# Patient Record
Sex: MALE | Race: OTHER | HISPANIC OR LATINO | Employment: OTHER | URBAN - METROPOLITAN AREA
[De-identification: names, ages, dates, MRNs, and addresses within clinical notes are randomized per-mention and may not be internally consistent; named-entity substitution may affect disease eponyms.]

---

## 2023-06-09 ENCOUNTER — APPOINTMENT (OUTPATIENT)
Dept: NON INVASIVE DIAGNOSTICS | Facility: HOSPITAL | Age: 68
End: 2023-06-09
Payer: MEDICARE

## 2023-06-09 ENCOUNTER — APPOINTMENT (EMERGENCY)
Dept: CT IMAGING | Facility: HOSPITAL | Age: 68
End: 2023-06-09
Payer: MEDICARE

## 2023-06-09 ENCOUNTER — HOSPITAL ENCOUNTER (OUTPATIENT)
Facility: HOSPITAL | Age: 68
Setting detail: OBSERVATION
Discharge: HOME | End: 2023-06-11
Attending: EMERGENCY MEDICINE | Admitting: INTERNAL MEDICINE
Payer: MEDICARE

## 2023-06-09 DIAGNOSIS — N17.9 AKI (ACUTE KIDNEY INJURY) (HCC): ICD-10-CM

## 2023-06-09 DIAGNOSIS — E11.65 HYPERGLYCEMIA DUE TO DIABETES MELLITUS (HCC): ICD-10-CM

## 2023-06-09 DIAGNOSIS — Z95.828 S/P IVC FILTER: ICD-10-CM

## 2023-06-09 DIAGNOSIS — R65.10 SIRS (SYSTEMIC INFLAMMATORY RESPONSE SYNDROME) (HCC): ICD-10-CM

## 2023-06-09 DIAGNOSIS — K52.9 COLITIS: Primary | ICD-10-CM

## 2023-06-09 PROBLEM — I82.409 DVT (DEEP VENOUS THROMBOSIS) (HCC): Status: ACTIVE | Noted: 2023-06-09

## 2023-06-09 PROBLEM — F39 MOOD DISORDER (HCC): Status: ACTIVE | Noted: 2023-06-09

## 2023-06-09 PROBLEM — R93.89 ABNORMAL CT SCAN: Status: ACTIVE | Noted: 2023-06-09

## 2023-06-09 PROBLEM — E11.3299 CONTROLLED TYPE 2 DIABETES MELLITUS WITH MILD NONPROLIFERATIVE RETINOPATHY WITHOUT MACULAR EDEMA, WITH LONG-TERM CURRENT USE OF INSULIN (HCC): Status: ACTIVE | Noted: 2023-06-09

## 2023-06-09 PROBLEM — Z79.4 CONTROLLED TYPE 2 DIABETES MELLITUS WITH MILD NONPROLIFERATIVE RETINOPATHY WITHOUT MACULAR EDEMA, WITH LONG-TERM CURRENT USE OF INSULIN (HCC): Status: ACTIVE | Noted: 2023-06-09

## 2023-06-09 PROBLEM — I10 HYPERTENSION: Status: ACTIVE | Noted: 2023-06-09

## 2023-06-09 LAB
ABO GROUP BLD: NORMAL
ABO GROUP BLD: NORMAL
ALBUMIN SERPL BCP-MCNC: 3.9 G/DL (ref 3.5–5)
ALP SERPL-CCNC: 81 U/L (ref 34–104)
ALT SERPL W P-5'-P-CCNC: 29 U/L (ref 7–52)
ANION GAP SERPL CALCULATED.3IONS-SCNC: 13 MMOL/L (ref 4–13)
APTT PPP: 36 SECONDS (ref 23–37)
APTT PPP: >210 SECONDS (ref 23–37)
AST SERPL W P-5'-P-CCNC: 41 U/L (ref 13–39)
ATRIAL RATE: 103 BPM
BASOPHILS # BLD MANUAL: 0 THOUSAND/UL (ref 0–0.1)
BASOPHILS NFR MAR MANUAL: 0 % (ref 0–1)
BILIRUB SERPL-MCNC: 0.84 MG/DL (ref 0.2–1)
BLD GP AB SCN SERPL QL: NEGATIVE
BUN SERPL-MCNC: 24 MG/DL (ref 5–25)
CALCIUM SERPL-MCNC: 10.8 MG/DL (ref 8.4–10.2)
CHLORIDE SERPL-SCNC: 98 MMOL/L (ref 96–108)
CO2 SERPL-SCNC: 24 MMOL/L (ref 21–32)
CREAT SERPL-MCNC: 1.62 MG/DL (ref 0.6–1.3)
EOSINOPHIL # BLD MANUAL: 0 THOUSAND/UL (ref 0–0.4)
EOSINOPHIL NFR BLD MANUAL: 0 % (ref 0–6)
ERYTHROCYTE [DISTWIDTH] IN BLOOD BY AUTOMATED COUNT: 13.2 % (ref 11.6–15.1)
GFR SERPL CREATININE-BSD FRML MDRD: 43 ML/MIN/1.73SQ M
GLUCOSE SERPL-MCNC: 216 MG/DL (ref 65–140)
GLUCOSE SERPL-MCNC: 311 MG/DL (ref 65–140)
GLUCOSE SERPL-MCNC: 407 MG/DL (ref 65–140)
GLUCOSE SERPL-MCNC: 488 MG/DL (ref 65–140)
GLUCOSE SERPL-MCNC: 90 MG/DL (ref 65–140)
HCT VFR BLD AUTO: 45.8 % (ref 36.5–49.3)
HGB BLD-MCNC: 14.8 G/DL (ref 12–17)
INR PPP: 1.87 (ref 0.84–1.19)
LG PLATELETS BLD QL SMEAR: PRESENT
LIPASE SERPL-CCNC: 11 U/L (ref 11–82)
LYMPHOCYTES # BLD AUTO: 0.71 THOUSAND/UL (ref 0.6–4.47)
LYMPHOCYTES # BLD AUTO: 4 % (ref 14–44)
MCH RBC QN AUTO: 31.3 PG (ref 26.8–34.3)
MCHC RBC AUTO-ENTMCNC: 32.3 G/DL (ref 31.4–37.4)
MCV RBC AUTO: 97 FL (ref 82–98)
MONOCYTES # BLD AUTO: 1.6 THOUSAND/UL (ref 0–1.22)
MONOCYTES NFR BLD: 9 % (ref 4–12)
NEUTROPHILS # BLD MANUAL: 15.49 THOUSAND/UL (ref 1.85–7.62)
NEUTS BAND NFR BLD MANUAL: 3 % (ref 0–8)
NEUTS SEG NFR BLD AUTO: 84 % (ref 43–75)
P AXIS: 62 DEGREES
PLATELET # BLD AUTO: 229 THOUSANDS/UL (ref 149–390)
PLATELET BLD QL SMEAR: ADEQUATE
PMV BLD AUTO: 10.6 FL (ref 8.9–12.7)
POTASSIUM SERPL-SCNC: 5 MMOL/L (ref 3.5–5.3)
PR INTERVAL: 160 MS
PROT SERPL-MCNC: 7.5 G/DL (ref 6.4–8.4)
PROTHROMBIN TIME: 21.4 SECONDS (ref 11.6–14.5)
QRS AXIS: 35 DEGREES
QRSD INTERVAL: 70 MS
QT INTERVAL: 310 MS
QTC INTERVAL: 406 MS
RBC # BLD AUTO: 4.73 MILLION/UL (ref 3.88–5.62)
RBC MORPH BLD: NORMAL
RH BLD: POSITIVE
RH BLD: POSITIVE
SODIUM SERPL-SCNC: 135 MMOL/L (ref 135–147)
SPECIMEN EXPIRATION DATE: NORMAL
T WAVE AXIS: 71 DEGREES
VENTRICULAR RATE: 103 BPM
WBC # BLD AUTO: 17.81 THOUSAND/UL (ref 4.31–10.16)

## 2023-06-09 PROCEDURE — 80053 COMPREHEN METABOLIC PANEL: CPT | Performed by: EMERGENCY MEDICINE

## 2023-06-09 PROCEDURE — 93010 ELECTROCARDIOGRAM REPORT: CPT | Performed by: STUDENT IN AN ORGANIZED HEALTH CARE EDUCATION/TRAINING PROGRAM

## 2023-06-09 PROCEDURE — 82948 REAGENT STRIP/BLOOD GLUCOSE: CPT

## 2023-06-09 PROCEDURE — 96366 THER/PROPH/DIAG IV INF ADDON: CPT

## 2023-06-09 PROCEDURE — 93970 EXTREMITY STUDY: CPT | Performed by: SURGERY

## 2023-06-09 PROCEDURE — 85007 BL SMEAR W/DIFF WBC COUNT: CPT | Performed by: EMERGENCY MEDICINE

## 2023-06-09 PROCEDURE — 74177 CT ABD & PELVIS W/CONTRAST: CPT

## 2023-06-09 PROCEDURE — 36415 COLL VENOUS BLD VENIPUNCTURE: CPT | Performed by: EMERGENCY MEDICINE

## 2023-06-09 PROCEDURE — 99223 1ST HOSP IP/OBS HIGH 75: CPT | Performed by: SURGERY

## 2023-06-09 PROCEDURE — 87505 NFCT AGENT DETECTION GI: CPT | Performed by: INTERNAL MEDICINE

## 2023-06-09 PROCEDURE — 93005 ELECTROCARDIOGRAM TRACING: CPT

## 2023-06-09 PROCEDURE — C9113 INJ PANTOPRAZOLE SODIUM, VIA: HCPCS | Performed by: INTERNAL MEDICINE

## 2023-06-09 PROCEDURE — G1004 CDSM NDSC: HCPCS

## 2023-06-09 PROCEDURE — 83690 ASSAY OF LIPASE: CPT | Performed by: EMERGENCY MEDICINE

## 2023-06-09 PROCEDURE — 99223 1ST HOSP IP/OBS HIGH 75: CPT | Performed by: INTERNAL MEDICINE

## 2023-06-09 PROCEDURE — 85730 THROMBOPLASTIN TIME PARTIAL: CPT | Performed by: EMERGENCY MEDICINE

## 2023-06-09 PROCEDURE — 85610 PROTHROMBIN TIME: CPT | Performed by: EMERGENCY MEDICINE

## 2023-06-09 PROCEDURE — 96375 TX/PRO/DX INJ NEW DRUG ADDON: CPT

## 2023-06-09 PROCEDURE — 85027 COMPLETE CBC AUTOMATED: CPT | Performed by: EMERGENCY MEDICINE

## 2023-06-09 PROCEDURE — 86901 BLOOD TYPING SEROLOGIC RH(D): CPT | Performed by: EMERGENCY MEDICINE

## 2023-06-09 PROCEDURE — 86850 RBC ANTIBODY SCREEN: CPT | Performed by: EMERGENCY MEDICINE

## 2023-06-09 PROCEDURE — 85730 THROMBOPLASTIN TIME PARTIAL: CPT | Performed by: INTERNAL MEDICINE

## 2023-06-09 PROCEDURE — 86900 BLOOD TYPING SEROLOGIC ABO: CPT | Performed by: EMERGENCY MEDICINE

## 2023-06-09 PROCEDURE — 93970 EXTREMITY STUDY: CPT

## 2023-06-09 PROCEDURE — 99284 EMERGENCY DEPT VISIT MOD MDM: CPT

## 2023-06-09 PROCEDURE — 87493 C DIFF AMPLIFIED PROBE: CPT | Performed by: INTERNAL MEDICINE

## 2023-06-09 PROCEDURE — 96365 THER/PROPH/DIAG IV INF INIT: CPT

## 2023-06-09 RX ORDER — CEFTRIAXONE SODIUM 1 G/50ML
1000 INJECTION, SOLUTION INTRAVENOUS EVERY 24 HOURS
Status: DISCONTINUED | OUTPATIENT
Start: 2023-06-10 | End: 2023-06-09 | Stop reason: SDUPTHER

## 2023-06-09 RX ORDER — DIVALPROEX SODIUM 250 MG/1
500 TABLET, EXTENDED RELEASE ORAL
Status: DISCONTINUED | OUTPATIENT
Start: 2023-06-09 | End: 2023-06-11 | Stop reason: HOSPADM

## 2023-06-09 RX ORDER — TAMSULOSIN HYDROCHLORIDE 0.4 MG/1
0.4 CAPSULE ORAL
COMMUNITY

## 2023-06-09 RX ORDER — HALOPERIDOL 5 MG/1
5 TABLET ORAL
COMMUNITY

## 2023-06-09 RX ORDER — INSULIN LISPRO 100 [IU]/ML
2-12 INJECTION, SOLUTION INTRAVENOUS; SUBCUTANEOUS
Status: DISCONTINUED | OUTPATIENT
Start: 2023-06-09 | End: 2023-06-11 | Stop reason: HOSPADM

## 2023-06-09 RX ORDER — ONDANSETRON 2 MG/ML
4 INJECTION INTRAMUSCULAR; INTRAVENOUS EVERY 6 HOURS PRN
Status: DISCONTINUED | OUTPATIENT
Start: 2023-06-09 | End: 2023-06-11 | Stop reason: HOSPADM

## 2023-06-09 RX ORDER — HEPARIN SODIUM 1000 [USP'U]/ML
2800 INJECTION, SOLUTION INTRAVENOUS; SUBCUTANEOUS EVERY 6 HOURS PRN
Status: DISCONTINUED | OUTPATIENT
Start: 2023-06-09 | End: 2023-06-10

## 2023-06-09 RX ORDER — TAMSULOSIN HYDROCHLORIDE 0.4 MG/1
0.4 CAPSULE ORAL
Status: DISCONTINUED | OUTPATIENT
Start: 2023-06-09 | End: 2023-06-11 | Stop reason: HOSPADM

## 2023-06-09 RX ORDER — LORAZEPAM 2 MG/1
2 TABLET ORAL 2 TIMES DAILY
COMMUNITY

## 2023-06-09 RX ORDER — DOXEPIN HYDROCHLORIDE 50 MG/1
100 CAPSULE ORAL
Status: DISCONTINUED | OUTPATIENT
Start: 2023-06-09 | End: 2023-06-11 | Stop reason: HOSPADM

## 2023-06-09 RX ORDER — ACETAMINOPHEN 325 MG/1
650 TABLET ORAL EVERY 6 HOURS PRN
Status: DISCONTINUED | OUTPATIENT
Start: 2023-06-09 | End: 2023-06-11 | Stop reason: HOSPADM

## 2023-06-09 RX ORDER — PANTOPRAZOLE SODIUM 40 MG/10ML
40 INJECTION, POWDER, LYOPHILIZED, FOR SOLUTION INTRAVENOUS EVERY 12 HOURS SCHEDULED
Status: DISCONTINUED | OUTPATIENT
Start: 2023-06-09 | End: 2023-06-10

## 2023-06-09 RX ORDER — HEPARIN SODIUM 1000 [USP'U]/ML
5600 INJECTION, SOLUTION INTRAVENOUS; SUBCUTANEOUS EVERY 6 HOURS PRN
Status: DISCONTINUED | OUTPATIENT
Start: 2023-06-09 | End: 2023-06-10

## 2023-06-09 RX ORDER — INSULIN LISPRO 100 [IU]/ML
25 INJECTION, SUSPENSION SUBCUTANEOUS 2 TIMES DAILY WITH MEALS
COMMUNITY
End: 2023-06-11

## 2023-06-09 RX ORDER — FENTANYL CITRATE 50 UG/ML
50 INJECTION, SOLUTION INTRAMUSCULAR; INTRAVENOUS ONCE
Status: COMPLETED | OUTPATIENT
Start: 2023-06-09 | End: 2023-06-09

## 2023-06-09 RX ORDER — MAGNESIUM HYDROXIDE/ALUMINUM HYDROXICE/SIMETHICONE 120; 1200; 1200 MG/30ML; MG/30ML; MG/30ML
30 SUSPENSION ORAL EVERY 6 HOURS PRN
Status: DISCONTINUED | OUTPATIENT
Start: 2023-06-09 | End: 2023-06-11 | Stop reason: HOSPADM

## 2023-06-09 RX ORDER — GLIPIZIDE 10 MG/1
10 TABLET ORAL
COMMUNITY

## 2023-06-09 RX ORDER — DIVALPROEX SODIUM 500 MG/1
500 TABLET, EXTENDED RELEASE ORAL
COMMUNITY

## 2023-06-09 RX ORDER — SODIUM CHLORIDE, SODIUM GLUCONATE, SODIUM ACETATE, POTASSIUM CHLORIDE, MAGNESIUM CHLORIDE, SODIUM PHOSPHATE, DIBASIC, AND POTASSIUM PHOSPHATE .53; .5; .37; .037; .03; .012; .00082 G/100ML; G/100ML; G/100ML; G/100ML; G/100ML; G/100ML; G/100ML
75 INJECTION, SOLUTION INTRAVENOUS CONTINUOUS
Status: DISPENSED | OUTPATIENT
Start: 2023-06-09 | End: 2023-06-11

## 2023-06-09 RX ORDER — DOXEPIN HYDROCHLORIDE 100 MG/1
100 CAPSULE ORAL
COMMUNITY

## 2023-06-09 RX ORDER — HALOPERIDOL 5 MG/1
7.5 TABLET ORAL
Status: DISCONTINUED | OUTPATIENT
Start: 2023-06-09 | End: 2023-06-11 | Stop reason: HOSPADM

## 2023-06-09 RX ORDER — HEPARIN SODIUM 10000 [USP'U]/100ML
3-30 INJECTION, SOLUTION INTRAVENOUS
Status: DISCONTINUED | OUTPATIENT
Start: 2023-06-09 | End: 2023-06-10

## 2023-06-09 RX ORDER — INSULIN ASPART 100 [IU]/ML
20 INJECTION, SUSPENSION SUBCUTANEOUS
Status: DISCONTINUED | OUTPATIENT
Start: 2023-06-09 | End: 2023-06-10

## 2023-06-09 RX ORDER — LORAZEPAM 1 MG/1
2 TABLET ORAL 2 TIMES DAILY PRN
Status: DISCONTINUED | OUTPATIENT
Start: 2023-06-09 | End: 2023-06-11 | Stop reason: HOSPADM

## 2023-06-09 RX ORDER — CEFEPIME HYDROCHLORIDE 2 G/50ML
2000 INJECTION, SOLUTION INTRAVENOUS ONCE
Status: COMPLETED | OUTPATIENT
Start: 2023-06-09 | End: 2023-06-09

## 2023-06-09 RX ORDER — CEFTRIAXONE SODIUM 1 G/50ML
1000 INJECTION, SOLUTION INTRAVENOUS EVERY 24 HOURS
Status: DISCONTINUED | OUTPATIENT
Start: 2023-06-09 | End: 2023-06-11 | Stop reason: HOSPADM

## 2023-06-09 RX ADMIN — INSULIN ASPART 20 UNITS: 100 INJECTION, SUSPENSION SUBCUTANEOUS at 10:40

## 2023-06-09 RX ADMIN — CEFEPIME HYDROCHLORIDE 2000 MG: 2 INJECTION, SOLUTION INTRAVENOUS at 09:06

## 2023-06-09 RX ADMIN — INSULIN LISPRO 4 UNITS: 100 INJECTION, SOLUTION INTRAVENOUS; SUBCUTANEOUS at 16:33

## 2023-06-09 RX ADMIN — SODIUM CHLORIDE, SODIUM LACTATE, POTASSIUM CHLORIDE, AND CALCIUM CHLORIDE 1000 ML: .6; .31; .03; .02 INJECTION, SOLUTION INTRAVENOUS at 06:11

## 2023-06-09 RX ADMIN — INSULIN LISPRO 8 UNITS: 100 INJECTION, SOLUTION INTRAVENOUS; SUBCUTANEOUS at 13:10

## 2023-06-09 RX ADMIN — HEPARIN SODIUM 18 UNITS/KG/HR: 10000 INJECTION, SOLUTION INTRAVENOUS at 12:01

## 2023-06-09 RX ADMIN — PANTOPRAZOLE SODIUM 40 MG: 40 INJECTION, POWDER, FOR SOLUTION INTRAVENOUS at 10:39

## 2023-06-09 RX ADMIN — IOHEXOL 100 ML: 350 INJECTION, SOLUTION INTRAVENOUS at 06:59

## 2023-06-09 RX ADMIN — INSULIN ASPART 20 UNITS: 100 INJECTION, SUSPENSION SUBCUTANEOUS at 16:33

## 2023-06-09 RX ADMIN — DOXEPIN HYDROCHLORIDE 100 MG: 50 CAPSULE ORAL at 22:04

## 2023-06-09 RX ADMIN — SODIUM CHLORIDE, SODIUM GLUCONATE, SODIUM ACETATE, POTASSIUM CHLORIDE, MAGNESIUM CHLORIDE, SODIUM PHOSPHATE, DIBASIC, AND POTASSIUM PHOSPHATE 75 ML/HR: .53; .5; .37; .037; .03; .012; .00082 INJECTION, SOLUTION INTRAVENOUS at 12:00

## 2023-06-09 RX ADMIN — HALOPERIDOL 7.5 MG: 5 TABLET ORAL at 22:04

## 2023-06-09 RX ADMIN — PANTOPRAZOLE SODIUM 40 MG: 40 INJECTION, POWDER, FOR SOLUTION INTRAVENOUS at 22:04

## 2023-06-09 RX ADMIN — CEFTRIAXONE 1000 MG: 1 INJECTION, SOLUTION INTRAVENOUS at 22:20

## 2023-06-09 RX ADMIN — TAMSULOSIN HYDROCHLORIDE 0.4 MG: 0.4 CAPSULE ORAL at 16:32

## 2023-06-09 RX ADMIN — FENTANYL CITRATE 50 MCG: 50 INJECTION, SOLUTION INTRAMUSCULAR; INTRAVENOUS at 06:07

## 2023-06-09 RX ADMIN — DIVALPROEX SODIUM 500 MG: 250 TABLET, EXTENDED RELEASE ORAL at 22:04

## 2023-06-09 NOTE — ASSESSMENT & PLAN NOTE
"No results found for: \"HGBA1C\"    No results for input(s): \"POCGLU\" in the last 72 hours      Blood Sugar Average: Last 72 hrs:    Patient has history of insulin-dependent type 2 diabetes on 70 3025 units twice daily  Patient denies nausea and reports he can eat today  Hyperglycemic on admission  We will start 70 30 20 units twice daily plus sliding scale  "

## 2023-06-09 NOTE — ASSESSMENT & PLAN NOTE
History of DVT on Xarelto  No previous records, patient with creatinine of 1 6  We will maintain on heparin drip for now until renal function improves Private Vehicle

## 2023-06-09 NOTE — CONSULTS
Consult Note - Vascular Surgery   Devin Verde 79 y o  male MRN: [id number]    Unit/Bed#: E2 -01 Encounter: [id number]    Assessment:  Presence of IVC with abnormalities on CT scan  IVC occlusion  -IVC placed 10 years ago in [place]  -No records or prior vascular imaging available  Vascular findings on CT a/p 6/9/23: Presence of IVC filter    -Most of the filter legs protrude through the wall of the IVC including 1 that projects into the aorta which is likely chronic    -There is also a fractured leg extending above the tip of the filter that also chronically protrudes through the ventral left aspect of the IVC  -The superior aspect of the fractured leg is just posterior to the third portion of the duodenum  No retroperitoneal hematoma or stranding    -No active contrast extravasation  IVC is collapsed and nonopacified at and below the level of the filter likely due to chronic occlusion  Iliac veins are small    -There are several collateral vessels in the retroperitoneum and ventral abdominal wall      Recommendations  -Chronic protrusions/fractured leg of IVC filter noted on CT scans  -No current symptoms attributable to IVC   -No indication for vascular surgery or procedures on the IVC at this time  -Continue with chronic anticoagulation with rivaroxaban 20 mg daily  -Would benefit from compression stockings  -Follow-up as routine with his vascular providers when he returns home  -Call if any questions  -Patient seen and examined with Dr Dania Halsted  -Findings were communicated to the Sonya Peterson attending  -Will follow as needed      Bilateral chronic DVT's   -Chronically on a/c x 10 years  -Chronic leg swelling without increase leg pain or edema  -LE venous duplex 6/9/23: B, chronic, non-occlusive DVT in the B CFA  Chronic R gastroc  DVT    Recommendations:  -As long as he tolerates, he should likely continue (lifelong) chronic anticoagulation on rivaroxaban 20 mg  -Consider compression stockings  -Compression, elevation and activity as tolerated  -He can follow up with his regular doctors in [place] for further recommendations      Requesting Service: SLIM    Reason for consultation: Presence of IV filter with filter leg protrusion    HPI: Asaf James is a 79 y o  male visiting from [facility] with diabetes requiring insulin, bilateral DVTs on chronic anticoagulation with Xarelto 20 mg and IVC filter implanted about 10 years ago per patient  Patient reports that he came to [facility] from [address] 3 days ago to visit his daughter  He was in his usual state of health until last night when after eating pork ribs he developed abdominal pain and profuse diarrhea for which he came into the hospital  He was seen in the ED and underwent a CT scan of the abdomen/pelvis which is significant for nonspecific colitis possible infectious or inflammatory etiology  Also colonoscopy was recommended  The CT scanner also identified patient's IVC filter and noted that the suture line protruding through the wall of the IVC which is likely chronic finding  There was no evidence of retroperitoneal hematoma or expanding  There is likely an IVC occlusion at the level of the filter  There are noted to be several collateral vessels in the retroperitoneum and intra-abdominal wall  There are noted to be collateral vessels in the retroperitoneum and ventral abdominal wall  Vascular surgery was asked to consult regarding IVC filter findings  Patient reports abdominal pain and aching  He has no chest pain or shortness of breath  He does not feel feverish  He has not had any sick contacts  Review of vital signs shows stable blood pressure  He has been afebrile  Review of Systems:  Review of systems as above otherwise essentially negative  General: No fevers     Cardiovascular: no chest pain or dyspnea on exertion  Respiratory: no cough, shortness of breath, or wheezing  Gastrointestinal: as above  Genitourinary ROS: negative  Musculoskeletal ROS: negative  Neurological ROS: no TIA or stroke symptoms  Hematological and Lymphatic ROS: negative  Dermatological ROS: negative  Psychological ROS: negative  Ophthalmic ROS: negative  ENT ROS: negative    Past Medical History:  No past medical history on file  Past Surgical History:  No past surgical history on file  Social History:  Social History     Substance and Sexual Activity   Alcohol Use Not on file     Social History     Substance and Sexual Activity   Drug Use Not on file     Social History     Tobacco Use   Smoking Status Not on file   Smokeless Tobacco Not on file       Family History:  No family history on file  Allergies:   Allergies   Allergen Reactions   • Aspirin Anaphylaxis       Medications:  Current Facility-Administered Medications   Medication Dose Route Frequency   • acetaminophen (TYLENOL) tablet 650 mg  650 mg Oral Q6H PRN   • aluminum-magnesium hydroxide-simethicone (MYLANTA) oral suspension 30 mL  30 mL Oral Q6H PRN   • cefTRIAXone (ROCEPHIN) IVPB (premix) 1,000 mg 50 mL  1,000 mg Intravenous Q24H   • divalproex sodium (DEPAKOTE ER) 24 hr tablet 500 mg  500 mg Oral HS   • doxepin (SINEquan) capsule 100 mg  100 mg Oral HS   • haloperidol (HALDOL) tablet 7 5 mg  7 5 mg Oral HS   • heparin (porcine) 25,000 units in 0 45% NaCl 250 mL infusion (premix)  3-30 Units/kg/hr (Order-Specific) Intravenous Titrated   • heparin (porcine) injection 2,800 Units  2,800 Units Intravenous Q6H PRN   • heparin (porcine) injection 5,600 Units  5,600 Units Intravenous Q6H PRN   • insulin aspart protamine-insulin aspart (NovoLOG 70/30) 100 units/mL subcutaneous injection 20 Units  20 Units Subcutaneous BID AC   • insulin lispro (HumaLOG) 100 units/mL subcutaneous injection 2-12 Units  2-12 Units Subcutaneous 4x Daily (AC & HS)   • LORazepam (ATIVAN) tablet 2 mg  2 mg Oral BID PRN   • multi-electrolyte (PLASMALYTE-A/ISOLYTE-S PH 7 4) IV solution  75 "mL/hr Intravenous Continuous   • ondansetron (ZOFRAN) injection 4 mg  4 mg Intravenous Q6H PRN   • pantoprazole (PROTONIX) injection 40 mg  40 mg Intravenous Q12H [facility]   • tamsulosin (FLOMAX) capsule 0 4 mg  0 4 mg Oral Daily With Dinner       Vitals:  /69 (BP Location: Right arm)   Pulse 104   Temp 98 5 °F (36 9 °C) (Temporal)   Resp 18   Ht 5' 9\" (1 753 m)   Wt 71 7 kg (158 lb)   SpO2 98%   BMI 23 33 kg/m²     I/Os:  I/O last 24 hours: In: 1050 [IV Piggyback:1050]  Out: -     Lab Results and Cultures:   Lab Results   Component Value Date    HCT 45 8 06/09/2023    HGB 14 8 06/09/2023    MCV 97 06/09/2023     06/09/2023    WBC 17 81 (H) 06/09/2023     Lab Results   Component Value Date    BUN 24 06/09/2023    CALCIUM 10 8 (H) 06/09/2023    CL 98 06/09/2023    CO2 24 06/09/2023    CREATININE 1 62 (H) 06/09/2023    K 5 0 06/09/2023     Lab Results   Component Value Date    INR 1 87 (H) 06/09/2023    PROTIME 21 4 (H) 06/09/2023       Lipid Panel: No results found for: \"CHOL\",     Blood Culture: No results found for: \"BLOODCX\",   Urinalysis: No results found for: \"BILIRUBINUR\", \"BLOODU\", \"CLARITYU\", \"COLORU\", \"GLUCOSEU\", \"KETONESU\", \"LEUKOCYTESUR\", \"NITRITE\", \"PHUR\", \"PROTEINUA\", \"SPECGRAV\",   Urine Culture: No results found for: \"URINECX\",   Wound Culure: No results found for: \"WOUNDCULT\"    Imaging:    CT abdomen pelvis with contrast 6/9/2023\  FINDINGS:     ABDOMEN     LOWER CHEST:  No clinically significant abnormality identified in the visualized lower chest      LIVER/BILIARY TREE: No focal liver lesion or biliary dilatation  Mild focal fatty infiltration  in segment 4 adjacent to the falciform ligament      GALLBLADDER: There are gallstone(s) within the gallbladder, without pericholecystic inflammatory changes      SPLEEN:  Unremarkable      PANCREAS:  Unremarkable      ADRENAL GLANDS:  Unremarkable      KIDNEYS/URETERS: Punctate bilateral nonobstructing renal calculi   Mild fullness of the " renal collecting systems  There is left renal uroepithelial enhancement      STOMACH AND BOWEL: There is diffuse symmetric thickening of the colon consistent with nonspecific colitis  Mild mesenteric edema  No pneumatosis or portal venous gas  Infectious/inflammatory etiology favored      There is more pronounced irregular asymmetric thickening along the posterior and left wall of the rectum  Underlying neoplasm cannot be excluded and recommend follow-up with colonoscopy      There is diverticulosis  No bowel obstruction      APPENDIX: A normal appendix was visualized      ABDOMINOPELVIC CAVITY: Trace ascites in the right lower quadrant     No pneumoperitoneum  No lymphadenopathy      VESSELS:  Atherosclerotic calcifications  No abdominal aortic aneurysm      There is an IVC filter  Most of the filter legs protrude through the wall of the IVC including 1 that projects into the aorta which is likely chronic  There is also a fractured leg extending above the tip of the filter that also chronically protrudes   through the ventral left aspect of the IVC  The superior aspect of the fractured leg is just posterior to the third portion of the duodenum  No retroperitoneal hematoma or stranding  No active contrast extravasation  IVC is collapsed and nonopacified at   and below the level of the filter likely due to chronic occlusion  Iliac veins are small  There are several collateral vessels in the retroperitoneum and ventral abdominal wall      PELVIS     REPRODUCTIVE ORGANS:  Unremarkable for patient's age      URINARY BLADDER: Bladder wall is thickened  Correlate with urinalysis      ABDOMINAL WALL/INGUINAL REGIONS:  Unremarkable      OSSEOUS STRUCTURES: No acute osseous abnormality  Left total hip replacement      IMPRESSION:  Nonspecific colitis  Favor infectious/inflammatory etiology  More pronounced asymmetric thickening of the rectum  Recommend follow-up colonoscopy as underlying neoplasm cannot be excluded    Trace free fluid  Suspect chronic occlusion of the IVC at and below the level of the IVC filter  See above regarding IVC filter legs            VAS LE venous duplex 6/9/2023 (prelim)    CLINICAL:  Indications: The patient is admitted with other medical concern  Physician ordering venous doppler, patient has pmh of DVT in the legs, and  there is an IVC filter in place, a recent CT noted broken wires/limbs on the  filter  The patient takes xarelto  Operative History:  IVC filter  Risk Factors  The patient has history of HTN, Diabetes (IDDM) and DVT  FINDINGS:     Right          Impression                             GSV Inguinal   E1  Non Occlusive Thrombus (Chronic)    CFV            E1 Non Occlusive Thrombus (Chronic)    Gastrocnemius  E1  Non Occlusive Thrombus (Chronic)       Left           Impression                             GSV Inguinal   E1  Non Occlusive Thrombus (Chronic)    Ext_Iliac      Normal (Patent)                        CFV            E1 Non Occlusive Thrombus (Chronic)             CONCLUSION:  RIGHT LOWER LIMB:  There is chronic, non-occlusive deep vein thrombosis in the common femoral  vein, with suggestion of focal segment of subacute upon chronic deep vein  thrombosis in the common femoral vein at the confluence of the saphenofemoral  junction  There is chronic deep vein thrombosis in a gastrocnemius vein behind the knee  The visualized portions of the posterior tibial and peroneal veins are patent  No evidence of superficial thrombophlebitis noted beyond the saphenofemoral  junction findings  Doppler evaluation shows a normal response to augmentation maneuvers     Popliteal, posterior tibial arterial Doppler waveform's are triphasic/biphasic  LEFT LOWER LIMB:  There is chronic deep vein thrombosis in the common femoral vein, with  suggestion of focal segment of subacute upon chronic deep vein thrombosis at the  confluence of the saphenofemoral junction    The external iliac vein is patent  The popliteal vein and visualized portions of the posterior tibial and peroneal  veins are patent  The greater saphenous vein beyond the saphenofemoral junction is patent  Doppler evaluation shows a normal response to augmentation maneuvers  Popliteal, posterior tibial arterial Doppler waveform's are triphasic/biphasic  This result has not been signed  Information might be incomplete  Physical Exam:     General appearance: Patient is lying flat in bed  He is ill-appearing, but in no apparent resp distress   Skin: Skin color, texture, turgor normal  No rashes or lesions  Neurologic: Grossly normal  Head: Normocephalic, without obvious abnormality, atraumatic  Eyes: PERRLE  EOMI  Neck: no JVD and supple, symmetrical, trachea midline  Back: symmetric, no curvature  ROM normal  No CVA tenderness  Lungs: overall  clear to auscultation bilaterally  Chest wall:  no tenderness  Heart:  regular rate and rhythm, S1, S2 normal, no murmur, click, rub or gallop  Abdomen: mild tenderness mid abdomen  Soft    Extremities: 1+ Bilateral LE edema with chronic venous stasis changes    Pulse exam:  DP: Right: 2+ Left: 2+      Farhan Chase PA-C  6/9/2023  [facility]

## 2023-06-09 NOTE — ASSESSMENT & PLAN NOTE
There is an IVC filter  Most of the filter legs protrude through the wall of the IVC including 1 that projects into the aorta which is likely chronic  There is also a fractured leg extending above the tip of the filter that also chronically protrudes   through the ventral left aspect of the IVC  The superior aspect of the fractured leg is just posterior to the third portion of the duodenum  No retroperitoneal hematoma or stranding  No active contrast extravasation  IVC is collapsed and nonopacified at   and below the level of the filter likely due to chronic occlusion  Iliac veins are small  There are several collateral vessels in the retroperitoneum and ventral abdominal wall      Would appreciate vascular surgery comment on findings above  We will maintain patient on heparin infusion

## 2023-06-09 NOTE — ED CARE HANDOFF
Emergency Department Sign Out Note        Sign out and transfer of care from Dr Esvin Dixon  See Separate Emergency Department note  The patient, Zaheer Joyner, was evaluated by the previous provider for diarrhea and abdominal pain  Workup Completed:  CBC with leukocytosis  Pt given IVF and pain medication  ED Course / Workup Pending (followup):  Pending remainder of labs and CTAP                                  ED Course as of 06/09/23 0933   Fri Jun 09, 2023   0700 WBC(!): 17 81   0713 Glucose, Random(!): 009  Uncomplicated hyperglycemia, labs do not suggest acidosis  Per family, pt with known diabetes  Receiving IVF now  1698 Creatinine(!): 1 62  Unclear baseline   0715 Remainder of labs WNL   0751 CT abdomen pelvis with contrast  1 ) Nonspecific colitis  Favor infectious/inflammatory etiology  2 ) More pronounced asymmetric thickening of the rectum  Recommend follow-up colonoscopy as underlying neoplasm cannot be excluded  3 ) Trace free fluid  4 ) Suspect chronic occlusion of the IVC at and below the level of the IVC filter  5 ) IVC Filter Legs - Most of the filter legs protrude through the wall of the IVC including 1 that projects into the aorta which is likely chronic  There is also a fractured leg extending above the tip of the filter that also chronically protrudes through the ventral left aspect of the IVC  The superior aspect of the fractured leg is just posterior to the third portion of the duodenum  No retroperitoneal hematoma or stranding  No active contrast extravasation  IVC is collapsed and nonopacified at and below the level of the filter likely due to chronic occlusion  Iliac veins are small  There are several collateral vessels in the retroperitoneum and ventral abdominal wall    0823 Pt and family updated on results  Pt reports to feeling better at this time, no further episodes of diarrhea  HR remains high 90-low 100's    Family is unaware of pt's IVC filter and "does not recall when it was placed or complications regarding it  Family reports pt had a DVT \"many years ago\"  Recommending admission given that pt meets SIRS criteria secondary to his colitis, along with hyperglycemia, ? CARA and complications of IVC filter  Pt and family in agreement  They will attempt to obtain records from pt's PCP in [place]  Colitis likely viral, however will cover with Abx  Will also check C  Diff    [id number] CT abdomen pelvis with contrast  ADDENDUM - There is bowel wall thickening and uroepithelial enhancement in the left renal collecting system  Recommend correlation with urinalysis  Will check UA   0850 Spoke with SLIM, discussed pt's history, presentation and work up  Will accept in admission  Procedures  Medical Decision Making  Amount and/or Complexity of Data Reviewed  Labs: ordered  Decision-making details documented in ED Course  Radiology: ordered  Decision-making details documented in ED Course  Risk  Prescription drug management  Decision regarding hospitalization                Disposition  Final diagnoses:   Colitis   SIRS (systemic inflammatory response syndrome) (HCC)   Hyperglycemia due to diabetes mellitus (HCC)   CARA (acute kidney injury) ([place] [address] ) - unknown baseline creatinine   S/P IVC filter - with likely chronic occlusion and complications of filter legs     Time reflects when diagnosis was documented in both MDM as applicable and the Disposition within this note     Time User Action Codes Description Comment    6/9/2023  8:51 AM [place], Myah Add [K52 9] Colitis     6/9/2023  8:51 AM Brandon, Myah Add [R65 10] SIRS (systemic inflammatory response syndrome) ([place] [address] )     6/9/2023  8:51 AM Brandon, Myah Add [E11 65] Hyperglycemia due to diabetes mellitus ([place] [address] )     6/9/2023  8:52 AM Brandon, Myah Add [N17 9] CARA (acute kidney injury) ([place] [address] )     6/9/2023  8:52 AM Brandon, Myah Modify [N17 9] CARA (acute kidney injury) ([place] [address] ) unknown baseline creatinine    " 6/9/2023  8:53 AM Mellisa Rosenbaum Add [Y63 215] S/P IVC filter     6/9/2023  8:54 AM Mellisa Rosenbaum Modify [I95 582] S/P IVC filter with likely chronic occlusion and complications of filter legs      ED Disposition     ED Disposition   Admit    Condition   Stable    Date/Time   Fri Jun 9, 2023  8:51 AM    Comment   Case was discussed with JESS and the patient's admission status was agreed to be Admission Status: observation status to the service of Dr Louise Aguero  Follow-up Information    None       Patient's Medications   Discharge Prescriptions    No medications on file     No discharge procedures on file         ED Provider  Electronically Signed by     Karol Carpenter DO  06/09/23 1484

## 2023-06-09 NOTE — ED NOTES
Patient transported to [address] [place] [address],[address], [address] [facility]  06/09/23 4663

## 2023-06-09 NOTE — ED PROVIDER NOTES
"History  Chief Complaint   Patient presents with   • Diarrhea     Pt ate around 4pm, and has the diarrhea since  Per family \"the stool looked black and smelt funny\"     Patient is a 71-year-old male from [facility] that presents with an abrupt onset of abdominal pain and diarrhea  Patient ate beans, pork and rice earlier which she has had before  Nobody else at home is sick  Patient's family states that he had multiple bouts of dark and foul-smelling stool  No known prior GI bleeding or colitis  They state that tonight he started to become dizzy and lightheaded and almost fell  History provided by:  Patient and relative   used: No    Diarrhea  Associated symptoms: abdominal pain    Associated symptoms: no chills, no fever and no vomiting        None       No past medical history on file  No past surgical history on file  No family history on file  I have reviewed and agree with the history as documented  No existing history information found  No existing history information found  Review of Systems   Constitutional: Negative for chills and fever  Eyes: Negative for visual disturbance  Respiratory: Negative for cough, chest tightness and shortness of breath  Cardiovascular: Negative for chest pain and leg swelling  Gastrointestinal: Positive for abdominal pain and diarrhea  Negative for blood in stool, nausea and vomiting  Skin: Positive for color change and pallor  Negative for rash and wound  Allergic/Immunologic: Negative for immunocompromised state  Neurological: Positive for dizziness and light-headedness  Negative for syncope  All other systems reviewed and are negative  Physical Exam  Physical Exam  Vitals and nursing note reviewed  Constitutional:       General: He is not in acute distress  Appearance: He is well-developed  He is not ill-appearing, toxic-appearing or diaphoretic  HENT:      Head: Normocephalic and atraumatic        " Right Ear: External ear normal       Left Ear: External ear normal       Nose: No congestion  Eyes:      General: No scleral icterus  Conjunctiva/sclera: Conjunctivae normal       Right eye: Right conjunctiva is not injected  Left eye: Left conjunctiva is not injected  Neck:      Trachea: No tracheal deviation  Cardiovascular:      Rate and Rhythm: Regular rhythm  Tachycardia present  Pulses: Normal pulses  Pulmonary:      Effort: Pulmonary effort is normal  No respiratory distress  Breath sounds: No stridor  Abdominal:      Palpations: Abdomen is soft  Tenderness: There is generalized abdominal tenderness  There is no guarding or rebound  Skin:     General: Skin is warm and dry  Capillary Refill: Capillary refill takes less than 2 seconds  Coloration: Skin is not pale  Findings: No erythema or rash  Neurological:      Mental Status: He is alert and oriented to person, place, and time  Motor: No abnormal muscle tone     Psychiatric:         Mood and Affect: Mood normal          Behavior: Behavior normal          Vital Signs  ED Triage Vitals [06/09/23 0509]   Temperature Pulse Respirations Blood Pressure SpO2   98 °F (36 7 °C) (!) 114 17 110/63 98 %      Temp Source Heart Rate Source Patient Position - Orthostatic VS BP Location FiO2 (%)   Oral Monitor Sitting Right arm --      Pain Score       --           Vitals:    06/09/23 0509   BP: 110/63   Pulse: (!) 114   Patient Position - Orthostatic VS: Sitting         Visual Acuity      ED Medications  Medications   lactated ringers bolus 1,000 mL (1,000 mL Intravenous New Bag 6/9/23 0611)   fentanyl citrate (PF) 100 MCG/2ML 50 mcg (50 mcg Intravenous Given 6/9/23 0607)       Diagnostic Studies  Results Reviewed     Procedure Component Value Units Date/Time    CBC and differential [[id number]]  (Abnormal) Collected: 06/09/23 0606    Lab Status: Preliminary result Specimen: Blood from Arm, Left Updated: 06/09/23 8519 WBC 17 81 Thousand/uL      RBC 4 73 Million/uL      Hemoglobin 14 8 g/dL      Hematocrit 45 8 %      MCV 97 fL      MCH 31 3 pg      MCHC 32 3 g/dL      RDW 13 2 %      MPV 10 6 fL      Platelets 899 Thousands/uL     Comprehensive metabolic panel [[id number]] Collected: 06/09/23 0606    Lab Status: In process Specimen: Blood from Arm, Left Updated: 06/09/23 0617    Lipase [[id number]] Collected: 06/09/23 0606    Lab Status: In process Specimen: Blood from Arm, Left Updated: 06/09/23 0617    Edgar Locket [[id number]] Collected: 06/09/23 0606    Lab Status: In process Specimen: Blood from Arm, Left Updated: 06/09/23 0617    APTT [[id number]] Collected: 06/09/23 0606    Lab Status: In process Specimen: Blood from Arm, Left Updated: 06/09/23 0617    Occult Blood 1-3 Stool, (Diagnostic) [[id number]]     Lab Status: No result Specimen: Stool from Rectum                  CT abdomen pelvis with contrast    (Results Pending)              Procedures  ECG 12 Lead Documentation Only    Date/Time: 6/9/2023 6:07 AM    Performed by: Rosi Self DO  Authorized by: Rosi Self DO    Indications / Diagnosis:  Tachycardia  ECG reviewed by me, the ED Provider: yes    Patient location:  ED  Previous ECG:     Previous ECG:  Unavailable  Interpretation:     Interpretation: non-specific    Rate:     ECG rate:  103    ECG rate assessment: normal    Rhythm:     Rhythm: sinus rhythm and sinus tachycardia    Ectopy:     Ectopy: none    QRS:     QRS intervals:  Normal  Conduction:     Conduction: normal    ST segments:     ST segments:  Non-specific  T waves:     T waves: non-specific               ED Course                                             Medical Decision Making  Given patient's symptoms I am concerned for possible GI bleed with dark and foul-smelling stools  He is tachycardic here  Abdomen is soft but does have diffuse tenderness  He has no prior records or labs to compare to    Plan is to check labs to evaluate for possible anemia, electrolyte disturbance, renal failure, coagulopathy, pancreatitis and dehydration  We will check an EKG for his tachycardia  We will obtain a CT of his abdomen pelvis for further evaluation of his symptoms  We will give fentanyl for pain and 1 L of IV fluids for possible dehydration  We will obtain a type and screen and transfuse if needed  EKG is nonspecific but no acute ischemic changes  Labs and CT pending  S/o to oncoming physician  Amount and/or Complexity of Data Reviewed  Labs: ordered  Radiology: ordered  Risk  Prescription drug management  Disposition  Final diagnoses:   None     ED Disposition     None      Follow-up Information    None         Patient's Medications    No medications on file       No discharge procedures on file      PDMP Review     None          ED Provider  Electronically Signed by           Damaris Morales DO  06/09/23 7100

## 2023-06-09 NOTE — ED NOTES
Pt back from [address] [place] [address],[address], [address] [facility]  06/09/23 8485

## 2023-06-09 NOTE — PLAN OF CARE
Problem: SAFETY ADULT  Goal: Patient will remain free of falls  Description: INTERVENTIONS:  - Educate patient/family on patient safety including physical limitations  - Instruct patient to call for assistance with activity   - Consult OT/PT to assist with strengthening/mobility   - Keep Call bell within reach  - Keep bed low and locked with side rails adjusted as appropriate  - Keep care items and personal belongings within reach  - Initiate and maintain comfort rounds  - Make Fall Risk Sign visible to staff  - Offer Toileting every 2 Hours, in advance of need  - Initiate/Maintain bed alarm  - Obtain necessary fall risk management equipment: walker  - Apply yellow socks and bracelet for high fall risk patients  - Consider moving patient to room near nurses station  Outcome: Progressing     Problem: GASTROINTESTINAL - ADULT  Goal: Minimal or absence of nausea and/or vomiting  Description: INTERVENTIONS:  - Administer IV fluids if ordered to ensure adequate hydration  - Maintain NPO status until nausea and vomiting are resolved  - Nasogastric tube if ordered  - Administer ordered antiemetic medications as needed  - Provide nonpharmacologic comfort measures as appropriate  - Advance diet as tolerated, if ordered  - Consider nutrition services referral to assist patient with adequate nutrition and appropriate food choices  Outcome: Progressing     Problem: GASTROINTESTINAL - ADULT  Goal: Maintains adequate nutritional intake  Description: INTERVENTIONS:  - Monitor percentage of each meal consumed  - Identify factors contributing to decreased intake, treat as appropriate  - Assist with meals as needed  - Monitor I&O, weight, and lab values if indicated  - Obtain nutrition services referral as needed  Outcome: Progressing     Problem: GENITOURINARY - ADULT  Goal: Maintains or returns to baseline urinary function  Description: INTERVENTIONS:  - Assess urinary function  - Encourage oral fluids to ensure adequate hydration if ordered  - Administer IV fluids as ordered to ensure adequate hydration  - Administer ordered medications as needed  - Offer frequent toileting  - Follow urinary retention protocol if ordered  Outcome: Progressing     Problem: METABOLIC, FLUID AND ELECTROLYTES - ADULT  Goal: Electrolytes maintained within normal limits  Description: INTERVENTIONS:  - Monitor labs and assess patient for signs and symptoms of electrolyte imbalances  - Administer electrolyte replacement as ordered  - Monitor response to electrolyte replacements, including repeat lab results as appropriate  - Instruct patient on fluid and nutrition as appropriate  Outcome: Progressing  Goal: Fluid balance maintained  Description: INTERVENTIONS:  - Monitor labs   - Monitor I/O and WT  - Instruct patient on fluid and nutrition as appropriate  - Assess for signs & symptoms of volume excess or deficit  Outcome: Progressing  Goal: Glucose maintained within target range  Description: INTERVENTIONS:  - Monitor Blood Glucose as ordered  - Assess for signs and symptoms of hyperglycemia and hypoglycemia  - Administer ordered medications to maintain glucose within target range  - Assess nutritional intake and initiate nutrition service referral as needed  Outcome: Progressing

## 2023-06-09 NOTE — H&P
[address] [facility]  H&P  Name: Jake Anguiano 79 y o  male I MRN: [id number]  Unit/Bed#: E2 -01 I Date of Admission: 6/9/2023   Date of Service: 6/9/2023 I Hospital Day: 0      Assessment/Plan   * Colitis  Assessment & Plan  Patient presents with profuse diarrhea and abdominal pain over the last 24 hours  He reportedly had a pork meal yesterday and then developed the symptoms  No one else was sick in the family  Denies nausea and vomiting  Does report chills  CT with nonspecific colitis  Likely viral  Check stool studies  Patient will be on Rocephin with possible UTI awaiting UA  Continue supportive care IV fluids, Protonix, Zofran      Abnormal CT scan  Assessment & Plan  Urothelial enhancement on CT scan  UA pending  We will continue Rocephin for now    Presence of IVC filter  Assessment & Plan  There is an IVC filter  Most of the filter legs protrude through the wall of the IVC including 1 that projects into the aorta which is likely chronic  There is also a fractured leg extending above the tip of the filter that also chronically protrudes   through the ventral left aspect of the IVC  The superior aspect of the fractured leg is just posterior to the third portion of the duodenum  No retroperitoneal hematoma or stranding  No active contrast extravasation  IVC is collapsed and nonopacified at   and below the level of the filter likely due to chronic occlusion  Iliac veins are small  There are several collateral vessels in the retroperitoneum and ventral abdominal wall      Would appreciate vascular surgery comment on findings above  We will maintain patient on heparin infusion    DVT (deep venous thrombosis) ([facility] [address] )  Assessment & Plan  History of DVT on Xarelto  No previous records, patient with creatinine of 1 6  We will maintain on heparin drip for now until renal function improves    Controlled type 2 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, with long-term current use of insulin ([facility])  Assessment & Plan  No results found for: \"HGBA1C\"    No results for input(s): \"POCGLU\" in the last 72 hours  Blood Sugar Average: Last 72 hrs:    Patient has history of insulin-dependent type 2 diabetes on 70 3025 units twice daily  Patient denies nausea and reports he can eat today  Hyperglycemic on admission  We will start 70 30 20 units twice daily plus sliding scale         VTE Prophylaxis: Heparin drip  Code Status: Level 1 full code    Anticipated Length of Stay:  Patient will be admitted on an Observation basis with an anticipated length of stay of less than 2 midnights  Justification for Hospital Stay: Need for IV medications    Total Time for Visit, including Counseling / Coordination of Care: 60 minutes  Greater than 50% of this total time spent on direct patient counseling and coordination of care  Chief Complaint:   Diarrhea, abdominal pain    History of Present Illness:    Leny Treviño is a 79 y o  male With past medical history of DVT on Xarelto with IVC filter, insulin-dependent type 2 diabetes, mood disorder who presents to the hospital with abdominal pain with 1 day history of acute diarrhea  Patient had pork and rice meal and developed symptoms with multiple bouts of foul-smelling liquid stool  He also had presyncopal symptoms with ambulation  Denies alcohol use drug use or tobacco use  Review of Systems:    Review of Systems   Constitutional: Positive for chills  Negative for fever  HENT: Negative for sore throat and trouble swallowing  Eyes: Negative for photophobia and visual disturbance  Respiratory: Negative for shortness of breath and wheezing  Cardiovascular: Negative for chest pain and palpitations  Gastrointestinal: Positive for abdominal pain and diarrhea  Negative for constipation, nausea and vomiting  Genitourinary: Negative for difficulty urinating and dysuria     Musculoskeletal: Negative for " arthralgias and myalgias  Skin: Negative for rash and wound  Neurological: Negative for dizziness, light-headedness and headaches  Past Medical and Surgical History:     No past medical history on file  No past surgical history on file  Meds/Allergies:    Prior to Admission medications    Medication Sig Start Date End Date Taking? Authorizing Provider   divalproex sodium (DEPAKOTE ER) 500 mg 24 hr tablet Take 500 mg by mouth daily at bedtime   Yes Historical Provider, MD   doxepin (SINEquan) 100 mg capsule Take 100 mg by mouth daily at bedtime   Yes Historical Provider, MD   glipiZIDE (GLUCOTROL) 10 mg tablet Take 10 mg by mouth 2 (two) times a day before meals   Yes Historical Provider, MD   haloperidol (HALDOL) 5 mg tablet Take 5 mg by mouth daily at bedtime Take 1 1/2 tabs at night   Yes Historical Provider, MD   insulin lispro protamine-insulin lispro (HumaLOG Mix 75/25) 100 units/mL Inject 25 Units under the skin 2 (two) times a day with meals   Yes Historical Provider, MD   LORazepam (ATIVAN) 2 mg tablet Take 2 mg by mouth 2 (two) times a day   Yes Historical Provider, MD   rivaroxaban (Xarelto) 20 mg tablet Take 20 mg by mouth daily   Yes Historical Provider, MD   tamsulosin (FLOMAX) 0 4 mg Take 0 4 mg by mouth daily with dinner   Yes Historical Provider, MD       Allergies:    Allergies   Allergen Reactions   • Aspirin Anaphylaxis       Social History:     Marital Status: /Civil Union   Substance Use History:   Social History     Substance and Sexual Activity   Alcohol Use Not on file     Social History     Tobacco Use   Smoking Status Not on file   Smokeless Tobacco Not on file     Social History     Substance and Sexual Activity   Drug Use Not on file       Family History:    Pertinent family history reviewed    Physical Exam:     Vitals:   Blood Pressure: 106/67 (06/09/23 0945)  Pulse: 104 (06/09/23 0945)  Temperature: 98 °F (36 7 °C) (06/09/23 0509)  Temp Source: Oral (06/09/23 "0509)  Respirations: 18 (06/09/23 0945)  Height: 5' 9\" (175 3 cm) (06/09/23 1905)  Weight - Scale: 71 7 kg (158 lb) (06/09/23 0958)  SpO2: 96 % (06/09/23 0945)    Physical Exam  Vitals reviewed  Constitutional:       General: He is not in acute distress  Appearance: He is well-developed  He is not ill-appearing, toxic-appearing or diaphoretic  HENT:      Head: Normocephalic and atraumatic  Mouth/Throat:      Mouth: Mucous membranes are moist    Eyes:      General: No scleral icterus  Extraocular Movements: Extraocular movements intact  Cardiovascular:      Rate and Rhythm: Normal rate and regular rhythm  Heart sounds: Normal heart sounds  Pulmonary:      Effort: Pulmonary effort is normal  No respiratory distress  Breath sounds: Normal breath sounds  No wheezing or rales  Abdominal:      General: There is no distension  Palpations: Abdomen is soft  Tenderness: There is no abdominal tenderness  There is no guarding or rebound  Musculoskeletal:         General: No swelling, tenderness or deformity  Right lower leg: Edema present  Left lower leg: Edema present  Comments: Ankle edema right greater than left   Skin:     General: Skin is warm and dry  Neurological:      General: No focal deficit present  Mental Status: He is alert  Mental status is at baseline  Psychiatric:         Mood and Affect: Mood normal          Behavior: Behavior normal          Thought Content:  Thought content normal          Judgment: Judgment normal           Additional Data:     Lab Results: I have reviewed pertinent results     Results from last 7 days   Lab Units 06/09/23  0606   BANDS PCT % 3   EOS PCT % 0   HEMATOCRIT % 45 8   HEMOGLOBIN g/dL 14 8   LYMPHO PCT % 4*   MONO PCT % 9   PLATELETS Thousands/uL 229   WBC Thousand/uL 17 81*     Results from last 7 days   Lab Units 06/09/23  0606   ANION GAP mmol/L 13   ALBUMIN g/dL 3 9   ALK PHOS U/L 81   ALT U/L 29   AST U/L 41* " BUN mg/dL 24   CALCIUM mg/dL 10 8*   CHLORIDE mmol/L 98   CO2 mmol/L 24   CREATININE mg/dL 1 62*   GLUCOSE RANDOM mg/dL 488*   POTASSIUM mmol/L 5 0   SODIUM mmol/L 135   TOTAL BILIRUBIN mg/dL 0 84     Results from last 7 days   Lab Units 06/09/23  0606   INR  1 87*                   Imaging: I have reviewed pertinent imaging     CT abdomen pelvis with contrast   Final Result by MYESHA Cantu MD (06/09 2949)   Addendum (preliminary) 1 of 1 by MYESHA Cantu MD (06/09 6885)   ADDENDUM:      As noted in the body the report, there is bowel wall thickening and    uroepithelial enhancement in the left renal collecting system  Recommend    correlation with urinalysis  Final   Nonspecific colitis  Favor infectious/inflammatory etiology  More pronounced asymmetric thickening of the rectum  Recommend follow-up    colonoscopy as underlying neoplasm cannot be excluded  Trace free fluid  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

## 2023-06-09 NOTE — ASSESSMENT & PLAN NOTE
Patient presents with profuse diarrhea and abdominal pain over the last 24 hours  He reportedly had a pork meal yesterday and then developed the symptoms  No one else was sick in the family  Denies nausea and vomiting  Does report chills    CT with nonspecific colitis  Likely viral  Check stool studies  Patient will be on Rocephin with possible UTI awaiting UA  Continue supportive care IV fluids, Protonix, Zofran Patient:   Angie Arthur            MRN: [id number]            FIN: [id number]               Age:   10 months     Sex:  MALE     :  19   Associated Diagnoses:   None   Author:   Zack Cowden      Discharge Summary    Admission Date:  10/27/17    Discharge Date:  10/27/17    Admitting Diagnoses:        Seizure    Final Diagnoses:  febrile seizures    Pertinent Labs/Imaging Findings:     Result title:  XR CHEST PA, LATERAL 2V  Result status:  Final  Verified by:  Rosalia Rolon on 2017 1:59  FINDINGS:Cardiomediastinal silhouette is within normal limits. There is no focal consolidation, pleural effusion, or pneumothorax. Mild peribronchial thickening is nonspecific and can be seen with upper respiratory infections or reactive airway disease. Osseous structures are within normal limits  IMPRESSION:1. No focal consolidation. Peribronchial thickening can be seen with an upper respiratory infection or reactive airway disease.       Labs between:  2017 02:11 to 2017 02:11    CBC:                 WBC  HgB  Hct  Plt  MCV  RDW   2017 6.1  11.9  34.5  294  79.5  13.4     DIFF:                 Seg  Neutroph//ABS  Lymph//ABS  Mono//ABS  EOS/ABS   [id number] NOT APPLICABLE  25 // 1.5 59 // (L) 3.6  13 // 0.8 2 // 0.1    BMP:                 Na  Cl  BUN  Glu   2017 139  104  6  (H) 136                              K  CO2  Cr  Ca                              5.4  21  0.19  10.0     CMP:                 AST  ALT  AlkPhos  Bili  Albumin   2017 50  28  245  (L) 0.1  4.2     Other Chem:             Mg  Phos  Triglycerides  GGTP  DirectBili                           2.1             POC GLU:                 Latest Result  Latest Date  Minimum  Min Date  Maximum  Max Date                             (H) 100  2017 (H) 100  2017 (H) 100                                  Hospital Course:  Mateusz aLra is a 7mo boy with a history of febrile seizure earlier this month who presents with a second seizure in the setting of low-grade fever. Prem Tom was at home when his parents noticed he was shaking, with extension of his limbs. He was not reactive. The seizure lasted for about ten minutes. Prem Tom had not been ill at this time; parents deny congestion, cough, shortness of breath, fever, nausea, vomiting, decreased PO intake, and altered mental status prior to this. He was given a one-time dose of ativan and slept afterwards. ED providers were concerned that they heard wheezing on exam, so he was given one treatment of duonebs. His temperature was measured after the seizure and it was found to be 38C; he has been otherwise afebrile. Prem Tom had a similar episode on 10/5, although at that time he was sick with fever and otitis media. He was seen in the ED and discharged to home as a one-time febrile seizure. His mother also had febrile seizures when she was young, and his 8yo sister has seizure disorder which is well-controlled. Upon admission to the floor, Prem Tom is sleeping comfortably. He is easily arousable and interactive. No fevers or other signs of latent illness. Patient had an EEG done today which came up normal. Patient is afebrile, tolerates roal feeds, has no diarrhea, vomiting, altered mental status, or another episode of seizure. Patient is discharged to follow up with his PMD within one week, and to meet with the neurologist in one year. Condition on Discharge:   Stable    Discharge Instructions:   PMD and Follow Up Appointment:  PCP: Dr Yee Feeling within one week    Consultants and Consultant Discharge Recs/Follow Up Appoinments:  Dr Mohini Baires. Appointment in 2 months.      Labs/Imaging to be done or followed up as outpatient:    none  Medications:  None    Diet:  Resume previous    Activity:  No restrictions, resume previous as tolerated    Special Instructions:  None    Discharge Summary faxed to PMD. Thank you for allowing us to participate in the care of this pleasant patient.                  Electronically Signed On 2017 14:32  __________________________________________________   [facility]      Electronically Signed On 2017 08:31  __________________________________________________   [facility]      __________________________________________________   [facility]

## 2023-06-10 LAB
ANION GAP SERPL CALCULATED.3IONS-SCNC: 4 MMOL/L (ref 4–13)
APTT PPP: 184 SECONDS (ref 23–37)
BILIRUB UR QL STRIP: NEGATIVE
BUN SERPL-MCNC: 25 MG/DL (ref 5–25)
C DIFF TOX GENS STL QL NAA+PROBE: NEGATIVE
CALCIUM SERPL-MCNC: 8.3 MG/DL (ref 8.4–10.2)
CAMPYLOBACTER DNA SPEC NAA+PROBE: NORMAL
CHLORIDE SERPL-SCNC: 105 MMOL/L (ref 96–108)
CLARITY UR: CLEAR
CO2 SERPL-SCNC: 29 MMOL/L (ref 21–32)
COLOR UR: YELLOW
CREAT SERPL-MCNC: 0.82 MG/DL (ref 0.6–1.3)
ERYTHROCYTE [DISTWIDTH] IN BLOOD BY AUTOMATED COUNT: 13.6 % (ref 11.6–15.1)
GFR SERPL CREATININE-BSD FRML MDRD: 92 ML/MIN/1.73SQ M
GLUCOSE SERPL-MCNC: 197 MG/DL (ref 65–140)
GLUCOSE SERPL-MCNC: 48 MG/DL (ref 65–140)
GLUCOSE SERPL-MCNC: 57 MG/DL (ref 65–140)
GLUCOSE SERPL-MCNC: 72 MG/DL (ref 65–140)
GLUCOSE SERPL-MCNC: 86 MG/DL (ref 65–140)
GLUCOSE SERPL-MCNC: 91 MG/DL (ref 65–140)
GLUCOSE UR STRIP-MCNC: NEGATIVE MG/DL
HCT VFR BLD AUTO: 34.8 % (ref 36.5–49.3)
HGB BLD-MCNC: 11.5 G/DL (ref 12–17)
HGB UR QL STRIP.AUTO: NEGATIVE
KETONES UR STRIP-MCNC: NEGATIVE MG/DL
LEUKOCYTE ESTERASE UR QL STRIP: NEGATIVE
MCH RBC QN AUTO: 31.8 PG (ref 26.8–34.3)
MCHC RBC AUTO-ENTMCNC: 33 G/DL (ref 31.4–37.4)
MCV RBC AUTO: 96 FL (ref 82–98)
NITRITE UR QL STRIP: NEGATIVE
PH UR STRIP.AUTO: 5.5 [PH]
PLATELET # BLD AUTO: 182 THOUSANDS/UL (ref 149–390)
PMV BLD AUTO: 12.1 FL (ref 8.9–12.7)
POTASSIUM SERPL-SCNC: 3.5 MMOL/L (ref 3.5–5.3)
PROT UR STRIP-MCNC: NEGATIVE MG/DL
RBC # BLD AUTO: 3.62 MILLION/UL (ref 3.88–5.62)
SALMONELLA DNA SPEC QL NAA+PROBE: NORMAL
SHIGA TOXIN STX GENE SPEC NAA+PROBE: NORMAL
SHIGELLA DNA SPEC QL NAA+PROBE: NORMAL
SODIUM SERPL-SCNC: 138 MMOL/L (ref 135–147)
SP GR UR STRIP.AUTO: 1.02 (ref 1–1.03)
UROBILINOGEN UR STRIP-ACNC: <2 MG/DL
WBC # BLD AUTO: 10.16 THOUSAND/UL (ref 4.31–10.16)

## 2023-06-10 PROCEDURE — 81003 URINALYSIS AUTO W/O SCOPE: CPT | Performed by: INTERNAL MEDICINE

## 2023-06-10 PROCEDURE — 80048 BASIC METABOLIC PNL TOTAL CA: CPT | Performed by: INTERNAL MEDICINE

## 2023-06-10 PROCEDURE — 99233 SBSQ HOSP IP/OBS HIGH 50: CPT | Performed by: PHYSICIAN ASSISTANT

## 2023-06-10 PROCEDURE — 85730 THROMBOPLASTIN TIME PARTIAL: CPT | Performed by: INTERNAL MEDICINE

## 2023-06-10 PROCEDURE — 85027 COMPLETE CBC AUTOMATED: CPT | Performed by: INTERNAL MEDICINE

## 2023-06-10 PROCEDURE — 82948 REAGENT STRIP/BLOOD GLUCOSE: CPT

## 2023-06-10 PROCEDURE — 92610 EVALUATE SWALLOWING FUNCTION: CPT

## 2023-06-10 PROCEDURE — C9113 INJ PANTOPRAZOLE SODIUM, VIA: HCPCS | Performed by: INTERNAL MEDICINE

## 2023-06-10 RX ORDER — INSULIN ASPART 100 [IU]/ML
10 INJECTION, SUSPENSION SUBCUTANEOUS
Status: DISCONTINUED | OUTPATIENT
Start: 2023-06-10 | End: 2023-06-11 | Stop reason: HOSPADM

## 2023-06-10 RX ORDER — PANTOPRAZOLE SODIUM 40 MG/1
40 TABLET, DELAYED RELEASE ORAL
Status: DISCONTINUED | OUTPATIENT
Start: 2023-06-11 | End: 2023-06-11 | Stop reason: HOSPADM

## 2023-06-10 RX ADMIN — CEFTRIAXONE 1000 MG: 1 INJECTION, SOLUTION INTRAVENOUS at 22:14

## 2023-06-10 RX ADMIN — SODIUM CHLORIDE, SODIUM GLUCONATE, SODIUM ACETATE, POTASSIUM CHLORIDE, MAGNESIUM CHLORIDE, SODIUM PHOSPHATE, DIBASIC, AND POTASSIUM PHOSPHATE 75 ML/HR: .53; .5; .37; .037; .03; .012; .00082 INJECTION, SOLUTION INTRAVENOUS at 22:15

## 2023-06-10 RX ADMIN — DOXEPIN HYDROCHLORIDE 100 MG: 50 CAPSULE ORAL at 22:10

## 2023-06-10 RX ADMIN — INSULIN ASPART 10 UNITS: 100 INJECTION, SUSPENSION SUBCUTANEOUS at 16:34

## 2023-06-10 RX ADMIN — DIVALPROEX SODIUM 500 MG: 250 TABLET, EXTENDED RELEASE ORAL at 22:10

## 2023-06-10 RX ADMIN — PANTOPRAZOLE SODIUM 40 MG: 40 INJECTION, POWDER, FOR SOLUTION INTRAVENOUS at 08:07

## 2023-06-10 RX ADMIN — HALOPERIDOL 7.5 MG: 5 TABLET ORAL at 22:10

## 2023-06-10 RX ADMIN — INSULIN LISPRO 2 UNITS: 100 INJECTION, SOLUTION INTRAVENOUS; SUBCUTANEOUS at 11:30

## 2023-06-10 RX ADMIN — SODIUM CHLORIDE, SODIUM GLUCONATE, SODIUM ACETATE, POTASSIUM CHLORIDE, MAGNESIUM CHLORIDE, SODIUM PHOSPHATE, DIBASIC, AND POTASSIUM PHOSPHATE 75 ML/HR: .53; .5; .37; .037; .03; .012; .00082 INJECTION, SOLUTION INTRAVENOUS at 11:42

## 2023-06-10 RX ADMIN — RIVAROXABAN 20 MG: 20 TABLET, FILM COATED ORAL at 08:53

## 2023-06-10 RX ADMIN — TAMSULOSIN HYDROCHLORIDE 0.4 MG: 0.4 CAPSULE ORAL at 16:34

## 2023-06-10 NOTE — SPEECH THERAPY NOTE
Speech Language/Pathology  Speech/Language Pathology  Assessment    Patient Name: Yoel Delacruz  Today's Date: 6/10/2023     Problem List  Principal Problem:    Colitis  Active Problems:    Controlled type 2 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, with long-term current use of insulin (HCC)    DVT (deep venous thrombosis) (HCC)    Presence of IVC filter    Abnormal CT scan    Mood disorder [facility])    Past Medical History  No past medical history on file  Past Surgical History  No past surgical history on file  Bedside Swallow Evaluation:    Summary:  Pt presented w/ denial of any swallowing or chewing difficulty  Family at bedside  Agreed  Pt ate his entire lunch  Chicken breast, etc  Currently on RA w/ clear speech and no facial asymmetry  Tolerated liquids w/ good oral control and transfer  Prompt swallow  Laryngeal rise appeared WNL  No cough or wet vocal quality  Limited eval  No current s/s  Recommendations:  Diet:regular as tolerated  Liquid: thin  Meds: as tolerated  Supervision: prn  Positioning:Upright  Pt to take PO/Meds only when fully alert and upright  Oral care  Aspiration precautions  Reflux precautions  Eval only, No f/u tx indicated  Consider consult w/:  Nutrition    H&P/Admit info/ pertinent provider notes: (PMH noted above)  Chief Complaint:   Diarrhea, abdominal pain  History of Present Illness:  Yoel Delacruz is a 79 y o  male With past medical history of DVT on Xarelto with IVC filter, insulin-dependent type 2 diabetes, mood disorder who presents to the hospital with abdominal pain with 1 day history of acute diarrhea  Patient had pork and rice meal and developed symptoms with multiple bouts of foul-smelling liquid stool  He also had presyncopal symptoms with ambulation  Denies alcohol use drug use or tobacco use  Per vascular sx 6/9/23:  IMPRESSION:  Nonspecific colitis   Favor infectious/inflammatory etiology  More pronounced asymmetric thickening of the rectum  Recommend follow-up colonoscopy as underlying neoplasm cannot be excluded  Trace free fluid  Suspect chronic occlusion of the IVC at and below the level of the IVC filter  See above regarding IVC filter legs  Special Studies:  6/9/23 ct abd/pelvis  IMPRESSION:  Nonspecific colitis  Favor infectious/inflammatory etiology  More pronounced asymmetric thickening of the rectum  Recommend follow-up colonoscopy as underlying neoplasm cannot be excluded  Trace free fluid  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 no  If yes, was nursing notified/was it addressed?  na    Reason for consult:  R/o aspiration  Determine safest and least restrictive diet    Precautions:  Contact  Hand hygeine    Food Allergies:  none   Current Diet:  regular   Premorbid diet:  same   O2 requirement:  none   Social/Prior living  home w/ SO   Voice/Speech:  wnl/[place]   Follows commands:  yes   Cognitive status:  alert     Oral mech exam:  Dentition:partial (most) natural    Lips (VII):+  Tongue (XII):+  Secretion management:+    Results d/w:  Pt, family, physician

## 2023-06-10 NOTE — PLAN OF CARE
Problem: Prexisting or High Potential for Compromised Skin Integrity  Goal: Skin integrity is maintained or improved  Description: INTERVENTIONS:  - Identify patients at risk for skin breakdown  - Assess and monitor skin integrity  - Assess and monitor nutrition and hydration status  - Monitor labs   - Assess for incontinence   - Turn and reposition patient  - Assist with mobility/ambulation  - Relieve pressure over bony prominences  - Avoid friction and shearing  - Provide appropriate hygiene as needed including keeping skin clean and dry  - Evaluate need for skin moisturizer/barrier cream  - Collaborate with interdisciplinary team   - Patient/family teaching  - Consider wound care consult   Outcome: Progressing     Problem: PAIN - ADULT  Goal: Verbalizes/displays adequate comfort level or baseline comfort level  Description: Interventions:  - Encourage patient to monitor pain and request assistance  - Assess pain using appropriate pain scale  - Administer analgesics based on type and severity of pain and evaluate response  - Implement non-pharmacological measures as appropriate and evaluate response  - Consider cultural and social influences on pain and pain management  - Notify physician/advanced practitioner if interventions unsuccessful or patient reports new pain  Outcome: Progressing     Problem: SAFETY ADULT  Goal: Patient will remain free of falls  Description: INTERVENTIONS:  - Educate patient/family on patient safety including physical limitations  - Instruct patient to call for assistance with activity   - Consult OT/PT to assist with strengthening/mobility   - Keep Call bell within reach  - Keep bed low and locked with side rails adjusted as appropriate  - Keep care items and personal belongings within reach  - Initiate and maintain comfort rounds  - Make Fall Risk Sign visible to staff  - Offer Toileting every 2 Hours, in advance of need  - Initiate/Maintain bed alarm  - Obtain necessary fall risk management equipment: walker  - Apply yellow socks and bracelet for high fall risk patients  - Consider moving patient to room near nurses station  Outcome: Progressing     Problem: GASTROINTESTINAL - ADULT  Goal: Minimal or absence of nausea and/or vomiting  Description: INTERVENTIONS:  - Administer IV fluids if ordered to ensure adequate hydration  - Maintain NPO status until nausea and vomiting are resolved  - Nasogastric tube if ordered  - Administer ordered antiemetic medications as needed  - Provide nonpharmacologic comfort measures as appropriate  - Advance diet as tolerated, if ordered  - Consider nutrition services referral to assist patient with adequate nutrition and appropriate food choices  Outcome: Progressing  Goal: Maintains adequate nutritional intake  Description: INTERVENTIONS:  - Monitor percentage of each meal consumed  - Identify factors contributing to decreased intake, treat as appropriate  - Assist with meals as needed  - Monitor I&O, weight, and lab values if indicated  - Obtain nutrition services referral as needed  Outcome: Progressing     Problem: GENITOURINARY - ADULT  Goal: Maintains or returns to baseline urinary function  Description: INTERVENTIONS:  - Assess urinary function  - Encourage oral fluids to ensure adequate hydration if ordered  - Administer IV fluids as ordered to ensure adequate hydration  - Administer ordered medications as needed  - Offer frequent toileting  - Follow urinary retention protocol if ordered  Outcome: Progressing     Problem: METABOLIC, FLUID AND ELECTROLYTES - ADULT  Goal: Electrolytes maintained within normal limits  Description: INTERVENTIONS:  - Monitor labs and assess patient for signs and symptoms of electrolyte imbalances  - Administer electrolyte replacement as ordered  - Monitor response to electrolyte replacements, including repeat lab results as appropriate  - Instruct patient on fluid and nutrition as appropriate  Outcome: Progressing

## 2023-06-10 NOTE — PROGRESS NOTES
"[facility]  Progress Note  Name: José Antonio Lopes  MRN: [id number]  Unit/Bed#: E2 -01 I Date of Admission: 6/9/2023   Date of Service: 6/10/2023 I Hospital Day: 0    Assessment/Plan   Mood disorder [facility])  Assessment & Plan  Continue home medications     Abnormal CT scan  Assessment & Plan  Urothelial enhancement on CT scan  UA pending  We will continue Rocephin for now    Presence of IVC filter  Assessment & Plan  There is an IVC filter  Most of the filter legs protrude through the wall of the IVC including 1 that projects into the aorta which is likely chronic  There is also a fractured leg extending above the tip of the filter that also chronically protrudes   through the ventral left aspect of the IVC  The superior aspect of the fractured leg is just posterior to the third portion of the duodenum  No retroperitoneal hematoma or stranding  No active contrast extravasation  IVC is collapsed and nonopacified at   and below the level of the filter likely due to chronic occlusion  Iliac veins are small  There are several collateral vessels in the retroperitoneum and ventral abdominal wall  appreciate vascular surgery consult on findings above  \"No indication for vascular surgery or procedures at this time   Can continue home xarelto dose\"  Heparin drip discontinued and xarelto reordered    DVT (deep venous thrombosis) ([facility] [address] )  Assessment & Plan  History of DVT on Xarelto  No previous records, patient with creatinine of 1 6 on admit, now normal at 0 82  Discontinue heparin drip and resume home dose xarelto      Controlled type 2 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, with long-term current use of insulin [facility])  Assessment & Plan  No results found for: \"HGBA1C\"    Recent Labs     06/09/23  1551 06/09/23  2031 06/10/23  0706 06/10/23  0756   POCGLU 216* 90 57* 72       Blood Sugar Average: Last 72 hrs:  (P) 227 [id number]  Patient " has history of insulin-dependent type 2 diabetes on 70 3025 units twice daily  Patient denies nausea and reports he can eat today  Hyperglycemic on admission  Was hypoglycemic this AM, blood sugar 57, came up to 72 after eating  Was started on 70/30 20 units twice daily plus sliding scale on admit  Will decrease to 10 units BID due to hypoglycemia    * Colitis  Assessment & Plan  Patient presents with profuse diarrhea and abdominal pain over the last 24 hours  He reportedly had a pork meal yesterday and then developed the symptoms  No one else was sick in the family  Denies nausea and vomiting  Does report chills  Last episode diarrhea last night  Pain still present but improved  Tolerating PO diet  CT with nonspecific colitis  Likely viral  Check stool studies  Patient will be on Rocephin with possible UTI awaiting UA  UA still pending, urine sample was mixed with stool  Continue supportive care IV fluids, Protonix, Zofran               VTE Pharmacologic Prophylaxis:   Pharmacologic: Rivaroxaban (Xarelto)  Mechanical VTE Prophylaxis in Place: No    Patient Centered Rounds: I have performed bedside rounds with nursing staff today  Education and Discussions with Family / Patient: wife at bedside, used  ipad service    Time Spent for Care: 45 minutes  More than 50% of total time spent on counseling and coordination of care as described above  Current Length of Stay: 0 day(s)    Current Patient Status: Observation   Certification Statement: The patient will continue to require additional inpatient hospital stay due to continue IV fluids and antibiotics, urine and stool studies pending    Discharge Plan: home when ready    Code Status: Level 1 - Full Code      Subjective:   79year old male admitted with colitis  Last episode of diarrhea was last night  Abdominal pain improved but still present  He is tolerating PO diet  Was noted to be hypoglycemic this morning   Wife at bedside  Used  service through ipad for translating    Objective:     Vitals:   Temp (24hrs), Av 2 °F (36 8 °C), Min:98 °F (36 7 °C), Max:98 5 °F (36 9 °C)    Temp:  [98 °F (36 7 °C)-98 5 °F (36 9 °C)] 98 °F (36 7 °C)  HR:  [] 81  Resp:  [16-18] 16  BP: (106-130)/(59-69) 106/59  SpO2:  [94 %-98 %] 94 %  Body mass index is 23 33 kg/m²  Input and Output Summary (last 24 hours): Intake/Output Summary (Last 24 hours) at 6/10/2023 0908  Last data filed at 2023 0936  Gross per 24 hour   Intake 50 ml   Output --   Net 50 ml       Physical Exam:     Physical Exam  Vitals reviewed  Constitutional:       General: He is not in acute distress  Appearance: He is not ill-appearing or diaphoretic  HENT:      Head: Normocephalic and atraumatic  Nose: Nose normal       Mouth/Throat:      Mouth: Mucous membranes are moist    Eyes:      Conjunctiva/sclera: Conjunctivae normal    Cardiovascular:      Rate and Rhythm: Normal rate  Pulmonary:      Effort: Pulmonary effort is normal  No respiratory distress  Abdominal:      General: Abdomen is flat  There is no distension  Palpations: Abdomen is soft  Comments: Mild generalized tenderness   Musculoskeletal:         General: No deformity or signs of injury  Skin:     General: Skin is warm and dry  Findings: No bruising or erythema  Neurological:      Mental Status: He is alert and oriented to person, place, and time     Psychiatric:         Mood and Affect: Mood normal          Behavior: Behavior normal            Additional Data:     Labs:    Results from last 7 days   Lab Units 06/10/23  0504 23  0606   BANDS PCT %  --  3   EOS PCT %  --  0   HEMATOCRIT % 34 8* 45 8   HEMOGLOBIN g/dL 11 5* 14 8   LYMPHO PCT %  --  4*   MONO PCT %  --  9   PLATELETS Thousands/uL 182 229   WBC Thousand/uL 10 16 17 81*     Results from last 7 days   Lab Units 06/10/23  0504 23  0606   ANION GAP mmol/L 4 13   ALBUMIN g/dL  --  3 9   ALK PHOS U/L  --  81   ALT U/L  --  29   AST U/L  --  41*   BUN mg/dL 25 24   CALCIUM mg/dL 8 3* 10 8*   CHLORIDE mmol/L 105 98   CO2 mmol/L 29 24   CREATININE mg/dL 0 82 1 62*   GLUCOSE RANDOM mg/dL 48* 488*   POTASSIUM mmol/L 3 5 5 0   SODIUM mmol/L 138 135   TOTAL BILIRUBIN mg/dL  --  0 84     Results from last 7 days   Lab Units 06/09/23  0606   INR  1 87*     Results from last 7 days   Lab Units 06/10/23  0756 06/10/23  0706 06/09/23  2031 06/09/23  1551 06/09/23  1305 06/09/23  1046   POC GLUCOSE mg/dl 72 57* 90 216* 311* 407*                   * I Have Reviewed All Lab Data Listed Above  * Additional Pertinent Lab Tests Reviewed:  [facility]        Recent Cultures (last 7 days):           Last 24 Hours Medication List:   Current Facility-Administered Medications   Medication Dose Route Frequency Provider Last Rate   • acetaminophen  650 mg Oral Q6H PRN El Arnt, DO     • aluminum-magnesium hydroxide-simethicone  30 mL Oral Q6H PRN El Arnt, DO     • cefTRIAXone  1,000 mg Intravenous Q24H El Arnt, DO 1,000 mg (06/09/23 2220)   • divalproex sodium  500 mg Oral HS El Arnt, DO     • doxepin  100 mg Oral HS El Arnt, DO     • haloperidol  7 5 mg Oral HS El Arnt, DO     • insulin aspart protamine-insulin aspart  10 Units Subcutaneous BID AC Pedro Horvath PA-C     • insulin lispro  2-12 Units Subcutaneous 4x Daily (AC & HS) El Arnt, DO     • LORazepam  2 mg Oral BID PRN El Arnt, DO     • multi-electrolyte  75 mL/hr Intravenous Continuous El Arnt, DO 75 mL/hr (06/09/23 1200)   • ondansetron  4 mg Intravenous Q6H PRN El Arnt, DO     • pantoprazole  40 mg Intravenous Q12H [address], DO     • rivaroxaban  20 mg Oral Daily With Breakfast Pedro Horvath PA-C     • tamsulosin  0 4 mg Oral Daily With [address] [place], DO          Today, Patient Was Seen By: Jovanna Tyson, TANVI    ** Please Note: Dictation voice to text software may have been used in the creation of this document   **

## 2023-06-10 NOTE — ASSESSMENT & PLAN NOTE
Patient presents with profuse diarrhea and abdominal pain over the last 24 hours  He reportedly had a pork meal yesterday and then developed the symptoms  No one else was sick in the family  Denies nausea and vomiting  Does report chills  Last episode diarrhea last night  Pain still present but improved  Tolerating PO diet  CT with nonspecific colitis  Likely viral  Check stool studies  Patient will be on Rocephin with possible UTI awaiting UA   UA still pending, urine sample was mixed with stool  Continue supportive care IV fluids, Protonix, Zofran

## 2023-06-10 NOTE — PLAN OF CARE
Problem: INFECTION - ADULT  Goal: Absence or prevention of progression during hospitalization  Description: INTERVENTIONS:  - Assess and monitor for signs and symptoms of infection  - Monitor lab/diagnostic results  - Monitor all insertion sites, i e  indwelling lines, tubes, and drains  - Monitor endotracheal if appropriate and nasal secretions for changes in amount and color  - [place] appropriate cooling/warming therapies per order  - Administer medications as ordered  - Instruct and encourage patient and family to use good hand hygiene technique  - Identify and instruct in appropriate isolation precautions for identified infection/condition  Outcome: Progressing     Problem: GASTROINTESTINAL - ADULT  Goal: Maintains adequate nutritional intake  Description: INTERVENTIONS:  - Monitor percentage of each meal consumed  - Identify factors contributing to decreased intake, treat as appropriate  - Assist with meals as needed  - Monitor I&O, weight, and lab values if indicated  - Obtain nutrition services referral as needed  Outcome: Progressing     Problem: METABOLIC, FLUID AND ELECTROLYTES - ADULT  Goal: Glucose maintained within target range  Description: INTERVENTIONS:  - Monitor Blood Glucose as ordered  - Assess for signs and symptoms of hyperglycemia and hypoglycemia  - Administer ordered medications to maintain glucose within target range  - Assess nutritional intake and initiate nutrition service referral as needed  Outcome: Progressing     Problem: METABOLIC, FLUID AND ELECTROLYTES - ADULT  Goal: Electrolytes maintained within normal limits  Description: INTERVENTIONS:  - Monitor labs and assess patient for signs and symptoms of electrolyte imbalances  - Administer electrolyte replacement as ordered  - Monitor response to electrolyte replacements, including repeat lab results as appropriate  - Instruct patient on fluid and nutrition as appropriate  Outcome: Progressing     Problem: COPING  Goal: Pt/Family able to verbalize concerns and demonstrate effective coping strategies  Description: INTERVENTIONS:  - Assist patient/family to identify coping skills, available support systems and cultural and spiritual values  - Provide emotional support, including active listening and acknowledgement of concerns of patient and caregivers  - Reduce environmental stimuli, as able  - Provide patient education  - Assess for spiritual pain/suffering and initiate spiritual care, including notification of Pastoral Care or roger based community as needed  - Assess effectiveness of coping strategies  Outcome: Progressing

## 2023-06-10 NOTE — ASSESSMENT & PLAN NOTE
"No results found for: \"HGBA1C\"    Recent Labs     06/09/23  1551 06/09/23  2031 06/10/23  0706 06/10/23  0756   POCGLU 216* 90 57* 72       Blood Sugar Average: Last 72 hrs:  (P) 712 [id number]  Patient has history of insulin-dependent type 2 diabetes on 70 3025 units twice daily  Patient denies nausea and reports he can eat today  Hyperglycemic on admission  Was hypoglycemic this AM, blood sugar 57, came up to 72 after eating  Was started on 70/30 20 units twice daily plus sliding scale on admit   Will decrease to 10 units BID due to hypoglycemia  "

## 2023-06-10 NOTE — PROGRESS NOTES
The pantoprazole has been converted to Oral per [facility] IV-to-PO Auto-Conversion Protocol for Adults as approved by the Pharmacy and Therapeutics Committee  The patient met all eligible criteria:  3 Age = 25years old   2) Received at least one dose of the IV form   3) Receiving at least one other scheduled oral/enteral medication   4) Tolerating an oral/enteral diet   and did not have any exclusions:   1) Critical care patient   2) Active GI bleed (IF assessing H2RAs or PPIs)   3) Continuous tube feeding (IF assessing cipro, doxycycline, levofloxacin, minocycline, rifampin, or voriconazole)   4) Receiving PO vancomycin (IF assessing metronidazole)   5) Persistent nausea and/or vomiting   6) Ileus or gastrointestinal obstruction   7) Armani/nasogastric tube set for continuous suction   8) Specific order not to automatically convert to PO (in the order's comments or if discussed in the most recent Infectious Disease or primary team's progress notes)

## 2023-06-10 NOTE — ASSESSMENT & PLAN NOTE
"There is an IVC filter  Most of the filter legs protrude through the wall of the IVC including 1 that projects into the aorta which is likely chronic  There is also a fractured leg extending above the tip of the filter that also chronically protrudes   through the ventral left aspect of the IVC  The superior aspect of the fractured leg is just posterior to the third portion of the duodenum  No retroperitoneal hematoma or stranding  No active contrast extravasation  IVC is collapsed and nonopacified at   and below the level of the filter likely due to chronic occlusion  Iliac veins are small  There are several collateral vessels in the retroperitoneum and ventral abdominal wall  appreciate vascular surgery consult on findings above  \"No indication for vascular surgery or procedures at this time   Can continue home xarelto dose\"  Heparin drip discontinued and xarelto reordered  "

## 2023-06-10 NOTE — ASSESSMENT & PLAN NOTE
History of DVT on Xarelto  No previous records, patient with creatinine of 1 6 on admit, now normal at 0 82  Discontinue heparin drip and resume home dose xarelto

## 2023-06-11 VITALS
BODY MASS INDEX: 23.4 KG/M2 | WEIGHT: 158 LBS | RESPIRATION RATE: 18 BRPM | TEMPERATURE: 98 F | HEART RATE: 100 BPM | SYSTOLIC BLOOD PRESSURE: 126 MMHG | DIASTOLIC BLOOD PRESSURE: 68 MMHG | OXYGEN SATURATION: 96 % | HEIGHT: 69 IN

## 2023-06-11 LAB
ANION GAP SERPL CALCULATED.3IONS-SCNC: 4 MMOL/L (ref 4–13)
BUN SERPL-MCNC: 11 MG/DL (ref 5–25)
CALCIUM SERPL-MCNC: 8.3 MG/DL (ref 8.4–10.2)
CHLORIDE SERPL-SCNC: 105 MMOL/L (ref 96–108)
CO2 SERPL-SCNC: 30 MMOL/L (ref 21–32)
CREAT SERPL-MCNC: 0.62 MG/DL (ref 0.6–1.3)
ERYTHROCYTE [DISTWIDTH] IN BLOOD BY AUTOMATED COUNT: 13.4 % (ref 11.6–15.1)
GFR SERPL CREATININE-BSD FRML MDRD: 103 ML/MIN/1.73SQ M
GLUCOSE SERPL-MCNC: 86 MG/DL (ref 65–140)
GLUCOSE SERPL-MCNC: 92 MG/DL (ref 65–140)
HCT VFR BLD AUTO: 34.2 % (ref 36.5–49.3)
HGB BLD-MCNC: 11 G/DL (ref 12–17)
MCH RBC QN AUTO: 31.4 PG (ref 26.8–34.3)
MCHC RBC AUTO-ENTMCNC: 32.2 G/DL (ref 31.4–37.4)
MCV RBC AUTO: 98 FL (ref 82–98)
PLATELET # BLD AUTO: 162 THOUSANDS/UL (ref 149–390)
PMV BLD AUTO: 10.8 FL (ref 8.9–12.7)
POTASSIUM SERPL-SCNC: 3.9 MMOL/L (ref 3.5–5.3)
RBC # BLD AUTO: 3.5 MILLION/UL (ref 3.88–5.62)
SODIUM SERPL-SCNC: 139 MMOL/L (ref 135–147)
WBC # BLD AUTO: 6.07 THOUSAND/UL (ref 4.31–10.16)

## 2023-06-11 PROCEDURE — 80048 BASIC METABOLIC PNL TOTAL CA: CPT | Performed by: PHYSICIAN ASSISTANT

## 2023-06-11 PROCEDURE — 85027 COMPLETE CBC AUTOMATED: CPT | Performed by: PHYSICIAN ASSISTANT

## 2023-06-11 PROCEDURE — 82948 REAGENT STRIP/BLOOD GLUCOSE: CPT

## 2023-06-11 PROCEDURE — 99239 HOSP IP/OBS DSCHRG MGMT >30: CPT | Performed by: PHYSICIAN ASSISTANT

## 2023-06-11 RX ORDER — PANTOPRAZOLE SODIUM 40 MG/1
40 TABLET, DELAYED RELEASE ORAL
Qty: 30 TABLET | Refills: 0 | Status: SHIPPED | OUTPATIENT
Start: 2023-06-12

## 2023-06-11 RX ORDER — INSULIN ASPART 100 [IU]/ML
10 INJECTION, SUSPENSION SUBCUTANEOUS
Qty: 10 ML | Refills: 0 | Status: SHIPPED | OUTPATIENT
Start: 2023-06-11

## 2023-06-11 RX ADMIN — RIVAROXABAN 20 MG: 20 TABLET, FILM COATED ORAL at 07:50

## 2023-06-11 RX ADMIN — PANTOPRAZOLE SODIUM 40 MG: 40 TABLET, DELAYED RELEASE ORAL at 05:36

## 2023-06-11 RX ADMIN — INSULIN ASPART 10 UNITS: 100 INJECTION, SUSPENSION SUBCUTANEOUS at 07:50

## 2023-06-11 NOTE — ASSESSMENT & PLAN NOTE
"No results found for: \"HGBA1C\"    Recent Labs     06/10/23  1103 06/10/23  1534 06/10/23  2100 06/11/23  0638   POCGLU 197* 86 91 92       Blood Sugar Average: Last 72 hrs:  (P) 161 9  Patient has history of insulin-dependent type 2 diabetes on 70 3025 units twice daily  Patient denies nausea and reports he can eat today  Hyperglycemic on admission  Was hypoglycemic yesterday AM, blood sugar 57, came up to 72 after eating    Was started on 70/30 20 units twice daily plus sliding scale on admit   Will decrease to 10 units BID due to hypoglycemia  Blood sugars in 90's since insulin adjusted  "

## 2023-06-11 NOTE — ASSESSMENT & PLAN NOTE
Patient presents with profuse diarrhea and abdominal pain over the last 24 hours  He reportedly had a pork meal yesterday and then developed the symptoms  No one else was sick in the family  Denies nausea and vomiting  Denies diarrhea  Pain much improved  Tolerating PO diet  CT with nonspecific colitis  Likely viral  Stool studies negative  Has been on rocephin awaiting UA  UA negative  Discontinue antibiotics

## 2023-06-11 NOTE — PLAN OF CARE
Problem: PAIN - ADULT  Goal: Verbalizes/displays adequate comfort level or baseline comfort level  Description: Interventions:  - Encourage patient to monitor pain and request assistance  - Assess pain using appropriate pain scale  - Administer analgesics based on type and severity of pain and evaluate response  - Implement non-pharmacological measures as appropriate and evaluate response  - Consider cultural and social influences on pain and pain management  - Notify physician/advanced practitioner if interventions unsuccessful or patient reports new pain  Outcome: Progressing     Problem: DISCHARGE PLANNING  Goal: Discharge to home or other facility with appropriate resources  Description: INTERVENTIONS:  - Identify barriers to discharge w/patient and caregiver  - Arrange for needed discharge resources and transportation as appropriate  - Identify discharge learning needs (meds, wound care, etc )  - Arrange for interpretive services to assist at discharge as needed  - Refer to Case Management Department for coordinating discharge planning if the patient needs post-hospital services based on physician/advanced practitioner order or complex needs related to functional status, cognitive ability, or social support system  Outcome: Progressing     Problem: Knowledge Deficit  Goal: Patient/family/caregiver demonstrates understanding of disease process, treatment plan, medications, and discharge instructions  Description: Complete learning assessment and assess knowledge base    Interventions:  - Provide teaching at level of understanding  - Provide teaching via preferred learning methods  Outcome: Progressing     Problem: METABOLIC, FLUID AND ELECTROLYTES - ADULT  Goal: Glucose maintained within target range  Description: INTERVENTIONS:  - Monitor Blood Glucose as ordered  - Assess for signs and symptoms of hyperglycemia and hypoglycemia  - Administer ordered medications to maintain glucose within target range  - Assess nutritional intake and initiate nutrition service referral as needed  Outcome: Progressing

## 2023-06-11 NOTE — ASSESSMENT & PLAN NOTE
History of DVT on Xarelto  No previous records, patient with creatinine of 1 6 on admit, now normal at 0 62  Discontinued heparin drip yesterday and resumed home dose xarelto

## 2023-06-11 NOTE — DISCHARGE SUMMARY
"[address] [facility]  Discharge- Devin Verde 1955, 79 y o  male MRN: [id number]  Unit/Bed#: E2 -01 Encounter: [id number]  Primary Care Provider: No primary care provider on file  Date and time admitted to hospital: 6/9/2023  5:13 AM    Mood disorder (HCC)  Assessment & Plan  Continue home medications     Abnormal CT scan  Assessment & Plan  Urothelial enhancement on CT scan  UA negative, rocephin discontinued    Presence of IVC filter  Assessment & Plan  There is an IVC filter  Most of the filter legs protrude through the wall of the IVC including 1 that projects into the aorta which is likely chronic  There is also a fractured leg extending above the tip of the filter that also chronically protrudes   through the ventral left aspect of the IVC  The superior aspect of the fractured leg is just posterior to the third portion of the duodenum  No retroperitoneal hematoma or stranding  No active contrast extravasation  IVC is collapsed and nonopacified at   and below the level of the filter likely due to chronic occlusion  Iliac veins are small  There are several collateral vessels in the retroperitoneum and ventral abdominal wall  appreciate vascular surgery consult on findings above  \"No indication for vascular surgery or procedures at this time   Can continue home xarelto dose\"  Heparin drip discontinued yesterday and xarelto restarted    DVT (deep venous thrombosis) ([facility] [address] )  Assessment & Plan  History of DVT on Xarelto  No previous records, patient with creatinine of 1 6 on admit, now normal at 0 62  Discontinued heparin drip yesterday and resumed home dose xarelto      Controlled type 2 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, with long-term current use of insulin [facility])  Assessment & Plan  No results found for: \"HGBA1C\"    Recent Labs     06/10/23  1103 06/10/23  1534 06/10/23  2100 06/11/23  0638   POCGLU 197* 86 91 92       Blood " "Sugar Average: Last 72 hrs:  (P) 161 9  Patient has history of insulin-dependent type 2 diabetes on 70 3025 units twice daily  Patient denies nausea and reports he can eat today  Hyperglycemic on admission  Was hypoglycemic yesterday AM, blood sugar 57, came up to 72 after eating    Was started on 70/30 20 units twice daily plus sliding scale on admit  Will decrease to 10 units BID due to hypoglycemia  Blood sugars in 90's since insulin adjusted    * Colitis  Assessment & Plan  Patient presents with profuse diarrhea and abdominal pain over the last 24 hours  He reportedly had a pork meal yesterday and then developed the symptoms  No one else was sick in the family  Denies nausea and vomiting  Denies diarrhea  Pain much improved  Tolerating PO diet  CT with nonspecific colitis  Likely viral  Stool studies negative  Has been on rocephin awaiting UA  UA negative  Discontinue antibiotics  Discharging Physician / Practitioner: Renetta Burks PA-C  PCP: No primary care provider on file  Admission Date:   Admission Orders (From admission, onward)     Ordered        06/09/23 0854  Place in Observation  Once                      Discharge Date: 06/11/23    Medical Problems     Resolved Problems  Date Reviewed: 6/11/2023   None         Consultations During Hospital Stay:  · Vascular surgery- known IVC filter inserted 10 years ago in [place]  Incidental finding on CT scan showed most of the filter legs protruding thru wall of IVC including 1 that projects into aorta which is likely chronic  Per vascular \"no indication for vascular surgery or procedures\"    Procedures Performed:   · none    Significant Findings / Test Results:   · UA negative    Incidental Findings:   · CT abdomen- nonspecific colitis- favor infectious/inflammatory etiology  More pronounced asymmetric thickening of rectum   Recommend follow up colonoscopy as outpatient as underlying neoplasm cannot be excluded     Test Results Pending at " "Discharge (will require follow up):   · none     Outpatient Tests Requested:  · none    Complications:  none    Reason for Admission: colitis    Hospital Course:     Ramya Bourgeois is a 79 y o  male patient who originally presented to the hospital on 6/9/2023 due to diarrhea and abdominal pain  Patient lives in [place] and is here visiting family  He reported eating pork and rice and developed multiple bouts of foul smelling liquid stool  Stool studies negative  Diarrhea resolved  Afebrile  Tolerating PO diet  He was seen by vascular surgery for incidental findings regarding his IVC filter on CT scan- please see above for details  No vascular surgery intervention required  Patient is eager for discharge and was discharged 6/11/23        Please see above list of diagnoses and related plan for additional information  Condition at Discharge: good     Discharge Day Visit / Exam:     Subjective:  Denies diarrhea  No fever  Vitals: Blood Pressure: 126/68 (06/11/23 0740)  Pulse: 100 (06/11/23 0740)  Temperature: 98 °F (36 7 °C) (06/11/23 0740)  Temp Source: Temporal (06/11/23 0740)  Respirations: 18 (06/11/23 0740)  Height: 5' 9\" (175 3 cm) (06/09/23 7985)  Weight - Scale: 71 7 kg (158 lb) (06/09/23 0958)  SpO2: 96 % (06/11/23 0740)  Exam:   Physical Exam  Vitals reviewed  Constitutional:       General: He is not in acute distress  Appearance: He is not ill-appearing or diaphoretic  HENT:      Head: Normocephalic and atraumatic  Nose: Nose normal       Mouth/Throat:      Mouth: Mucous membranes are moist    Eyes:      Conjunctiva/sclera: Conjunctivae normal    Cardiovascular:      Rate and Rhythm: Normal rate  Pulmonary:      Effort: Pulmonary effort is normal  No respiratory distress  Abdominal:      General: Abdomen is flat  There is no distension  Palpations: Abdomen is soft  Tenderness: There is no abdominal tenderness     Musculoskeletal:         General: No deformity or signs of " injury  Skin:     General: Skin is warm and dry  Findings: No bruising or erythema  Neurological:      Mental Status: He is alert and oriented to person, place, and time  Psychiatric:         Mood and Affect: Mood normal          Behavior: Behavior normal          Discussion with Family: wife at bedside    Discharge instructions/Information to patient and family:   See after visit summary for information provided to patient and family  Provisions for Follow-Up Care:  See after visit summary for information related to follow-up care and any pertinent home health orders  Disposition:     Home        Planned Readmission: no     Discharge Statement:  I spent 45 minutes discharging the patient  This time was spent on the day of discharge  I had direct contact with the patient on the day of discharge  Greater than 50% of the total time was spent examining patient, answering all patient questions, arranging and discussing plan of care with patient as well as directly providing post-discharge instructions  Additional time then spent on discharge activities  Discharge Medications:  See after visit summary for reconciled discharge medications provided to patient and family        ** Please Note: This note has been constructed using a voice recognition system **

## 2023-06-11 NOTE — ASSESSMENT & PLAN NOTE
"There is an IVC filter  Most of the filter legs protrude through the wall of the IVC including 1 that projects into the aorta which is likely chronic  There is also a fractured leg extending above the tip of the filter that also chronically protrudes   through the ventral left aspect of the IVC  The superior aspect of the fractured leg is just posterior to the third portion of the duodenum  No retroperitoneal hematoma or stranding  No active contrast extravasation  IVC is collapsed and nonopacified at   and below the level of the filter likely due to chronic occlusion  Iliac veins are small  There are several collateral vessels in the retroperitoneum and ventral abdominal wall  appreciate vascular surgery consult on findings above  \"No indication for vascular surgery or procedures at this time   Can continue home xarelto dose\"  Heparin drip discontinued yesterday and xarelto restarted  "